# Patient Record
Sex: FEMALE | Race: WHITE | Employment: OTHER | URBAN - METROPOLITAN AREA
[De-identification: names, ages, dates, MRNs, and addresses within clinical notes are randomized per-mention and may not be internally consistent; named-entity substitution may affect disease eponyms.]

---

## 2017-01-24 ENCOUNTER — HOSPITAL ENCOUNTER (OUTPATIENT)
Dept: ULTRASOUND IMAGING | Age: 57
Discharge: HOME | End: 2017-01-24

## 2017-01-24 DIAGNOSIS — R10.2 PELVIC PAIN IN FEMALE: ICD-10-CM

## 2017-01-24 DIAGNOSIS — R10.2 PELVIC AND PERINEAL PAIN: ICD-10-CM

## 2017-10-04 ENCOUNTER — OFFICE VISIT (OUTPATIENT)
Dept: ORTHOPEDIC SURGERY | Age: 57
End: 2017-10-04

## 2017-10-04 VITALS — HEIGHT: 59 IN | BODY MASS INDEX: 37.9 KG/M2 | RESPIRATION RATE: 16 BRPM | WEIGHT: 188 LBS

## 2017-10-04 DIAGNOSIS — S52.122A CLOSED DISPLACED FRACTURE OF HEAD OF LEFT RADIUS, INITIAL ENCOUNTER: Primary | ICD-10-CM

## 2017-10-04 PROCEDURE — 99203 OFFICE O/P NEW LOW 30 MIN: CPT | Performed by: ORTHOPAEDIC SURGERY

## 2017-10-04 PROCEDURE — 24650 CLTX RDL HEAD/NCK FX WO MNPJ: CPT | Performed by: ORTHOPAEDIC SURGERY

## 2017-10-04 NOTE — MR AVS SNAPSHOT
Tetanus Combination Vaccine (1 - Tdap) 2/8/1981    Pap Smear 2/8/1983    Cholesterol Screening 2/8/2002    Diabetes Screening 2/8/2002    Colonoscopy 2/8/2012    Mammograms are recommended every 2 years for low/average risk patients aged 48 - 69, and every year for high risk patients per updated national guidelines. However these guidelines can be individualized by your provider. 9/20/2014    Yearly Flu Vaccine (1) 9/1/2017            MyChart Signup           [employer] allows you to send messages to your doctor, view your test results, renew your prescriptions, schedule appointments, view visit notes, and more. How Do I Sign Up? 1. In your Internet browser, go to [email or web address]  2. Click on the Sign Up Now link in the Sign In box. You will see the New Member Sign Up page. 3. Enter your [employer] Access Code exactly as it appears below. You will not need to use this code after youve completed the sign-up process. If you do not sign up before the expiration date, you must request a new code. [employer] Access Code: [email or web address]  Expires: 11/28/2017 10:34 PM    4. Enter your Social Security Number ([id number]) and Date of Birth (mm/dd/yyyy) as indicated and click Submit. You will be taken to the next sign-up page. 5. Create a [employer] ID. This will be your [employer] login ID and cannot be changed, so think of one that is secure and easy to remember. 6. Create a [employer] password. You can change your password at any time. 7. Enter your Password Reset Question and Answer. This can be used at a later time if you forget your password. 8. Enter your e-mail address. You will receive e-mail notification when new information is available in [address] 9. Click Sign Up. You can now view your medical record. Additional Information  If you have questions, please contact the physician practice where you receive care. Remember, [employer] is NOT to be used for urgent needs.  For medical emergencies, dial 911. For questions regarding your [employer] account call [phone]. If you have a clinical question, please call your doctor's office.

## 2017-10-04 NOTE — PROGRESS NOTES
CHIEF COMPLAINT: Left elbow pain/ minimally displaced radial head fracture. DATE OF INJURY: 9/29/2017 DOT: 10/4/2017    HISTORY:  Ms. Sharri Melvin is a 56 y.o.  female right handed who presents today for evaluation of a left elbow injury. The patient reports that this injury occurred when she tripped and fell over a boulder at her home. She was first seen and evaluated in [facility], when she was x-rayed and splinted, and asked to f/u with Orthopedics. The patient denies any other injuries. Rates pain a 10/10 VAS sharp achy and not improving. Movement makes the pain worse, the splint and resting makes the pain better. No numbness or tingling sensation. She does not work and quit smoking 10 days ago. Past Medical History:   Diagnosis Date    Anxiety     Depression        History reviewed. No pertinent surgical history. Social History     Social History    Marital status: Single     Spouse name: N/A    Number of children: N/A    Years of education: N/A     Occupational History    Not on file. Social History Main Topics    Smoking status: Former Smoker     Types: Cigarettes     Quit date: 9/26/2017    Smokeless tobacco: Not on file      Comment: trying to quit    Alcohol use No    Drug use: No    Sexual activity: Not on file     Other Topics Concern    Not on file     Social History Narrative       History reviewed. No pertinent family history.     Current Outpatient Prescriptions on File Prior to Visit   Medication Sig Dispense Refill    aspirin 81 MG chewable tablet       atorvastatin (LIPITOR) 10 MG tablet       celecoxib (CELEBREX) 200 MG capsule Take 200 mg by mouth      EPINEPHrine (EPIPEN 2-PHONG) 0.3 MG/0.3ML SOAJ injection INJECT SYRINGE AS DIRECTED FOR ALLERGIC REACTION      gabapentin (NEURONTIN) 300 MG capsule Take 300 mg by mouth      hydrochlorothiazide (HYDRODIURIL) 12.5 MG tablet       ibuprofen (ADVIL;MOTRIN) 100 MG tablet Take 100 mg by mouth      LORazepam (ATIVAN) 0.5 MG tablet TAKE 1 TABLET BY MOUTH TWO TIMES A DAY      omeprazole (PRILOSEC) 20 MG delayed release capsule Take 20 mg by mouth      tiotropium (SPIRIVA RESPIMAT) 2.5 MCG/ACT AERS inhaler       traMADol (ULTRAM) 50 MG tablet Take 50 mg by mouth      albuterol sulfate HFA (VENTOLIN HFA) 108 (90 Base) MCG/ACT inhaler       zolpidem (AMBIEN) 5 MG tablet TAKE 1 TABLET BY MOUTH AT BEDTIME      Fluticasone Furoate-Vilanterol (BREO ELLIPTA) 200-25 MCG/INH AEPB Inhale into the lungs      varenicline (CHANTIX) 1 MG tablet Take 1 mg by mouth 2 times daily      oxyCODONE-acetaminophen (PERCOCET) 5-325 MG per tablet Take 1 tablet by mouth every 6 hours as needed for Pain  Sedation precautions. Do not drive or operate machinery while taking this medication. Do not drink alcohol. This is an addictive medication and should be used sparingly. . 30 tablet 0     No current facility-administered medications on file prior to visit. Pertinent items are noted in HPI  Review of systems reviewed from Patient History Form dated on 10/4/2017 and available in the patient's chart under the Media tab. PHYSICAL EXAMINATION:  Ms. Veronica Whitehead is a very pleasant 56 y.o.  female who presents today in no acute distress, awake, alert, and oriented. She is well dressed, nourished and  groomed. Patient with normal affect. Height is  4' 11\" (1.499 m), weight is 188 lb (85.3 kg), Body mass index is 37.97 kg/(m^2). Resting respiratory rate is 16. On evaluation of her left upper extremity, there is no obvious deformity. There is moderate swelling and moderate ecchymosis. She is tender to palpation over the radial head, and otherwise nontender over the remainder of the extremity. Range of motion is decreased secondary to pain over the left elbow, but no mechanical block. The skin overlying the left elbow is intact without evidence of lesion, laceration or abrasion. Distal pulses are 2+ and symmetric bilaterally.

## 2017-10-06 PROBLEM — S52.122A CLOSED DISPLACED FRACTURE OF HEAD OF LEFT RADIUS: Status: ACTIVE | Noted: 2017-10-06

## 2017-10-12 RX ORDER — OXYCODONE HYDROCHLORIDE AND ACETAMINOPHEN 5; 325 MG/1; MG/1
1 TABLET ORAL EVERY 6 HOURS PRN
Qty: 28 TABLET | Refills: 0 | Status: ON HOLD | OUTPATIENT
Start: 2017-10-12 | End: 2022-07-07

## 2017-11-15 ENCOUNTER — OFFICE VISIT (OUTPATIENT)
Dept: ORTHOPEDIC SURGERY | Age: 57
End: 2017-11-15

## 2017-11-15 VITALS
HEIGHT: 59 IN | HEART RATE: 66 BPM | BODY MASS INDEX: 37.9 KG/M2 | WEIGHT: 188 LBS | SYSTOLIC BLOOD PRESSURE: 123 MMHG | DIASTOLIC BLOOD PRESSURE: 70 MMHG

## 2017-11-15 DIAGNOSIS — S52.122A CLOSED DISPLACED FRACTURE OF HEAD OF LEFT RADIUS, INITIAL ENCOUNTER: Primary | ICD-10-CM

## 2017-11-15 PROCEDURE — 99024 POSTOP FOLLOW-UP VISIT: CPT | Performed by: NURSE PRACTITIONER

## 2017-11-16 ENCOUNTER — HOSPITAL ENCOUNTER (OUTPATIENT)
Dept: OTHER | Age: 57
Discharge: HOME | End: 2017-11-30
Attending: ORTHOPAEDIC SURGERY | Admitting: ORTHOPAEDIC SURGERY

## 2017-11-16 ASSESSMENT — PAIN DESCRIPTION - DESCRIPTORS: DESCRIPTORS: ACHING;SORE;SHOOTING;SHARP;CONSTANT

## 2017-11-16 ASSESSMENT — PAIN DESCRIPTION - ONSET: ONSET: ON-GOING

## 2017-11-16 ASSESSMENT — PAIN SCALES - GENERAL: PAINLEVEL_OUTOF10: 10

## 2017-11-16 ASSESSMENT — PAIN DESCRIPTION - LOCATION: LOCATION: ELBOW

## 2017-11-16 ASSESSMENT — PAIN DESCRIPTION - PAIN TYPE: TYPE: ACUTE PAIN

## 2017-11-16 ASSESSMENT — PAIN DESCRIPTION - FREQUENCY: FREQUENCY: CONTINUOUS

## 2017-11-16 ASSESSMENT — PAIN DESCRIPTION - ORIENTATION: ORIENTATION: LEFT

## 2017-11-16 ASSESSMENT — PAIN DESCRIPTION - PROGRESSION: CLINICAL_PROGRESSION: NOT CHANGED

## 2017-11-16 NOTE — FLOWSHEET NOTE
Physical Therapy Daily Treatment Note  Date:  2017    Patient Name:  Virginia Lowe    :  1960  MRN: [id number]  Restrictions/Precautions:    Pertinent Medical History:  Medical/Treatment Diagnosis Information:  · Diagnosis: closed displaced fracture of the head of the left radius  · Treatment Diagnosis: decreased ability to do adl's  Insurance/Certification information:  [place] [facility] [place] [facility] [place]  Physician Information:  Referring Practitioner: Dr. Chaidez Channel of care signed (Y/N): routed   Visit# / total visits:  Pain level: 4-10/10     G-Code (if applicable):  ,  3630 CL    Date / Visit # G-Code Applied:  /  PT G-Codes  Functional Assessment Tool Used: quick dash  Score: 39  Functional Limitation: Carrying, moving and handling objects  Carrying, Moving and Handling Objects Current Status (): At least 60 percent but less than 80 percent impaired, limited or restricted  Carrying, Moving and Handling Objects Goal Status (): At least 40 percent but less than 60 percent impaired, limited or restricted    Progress Note: []  Yes  []  No  Next due by: Visit #10      History of Injury:Pt is a 57 y/o female who fell while bringing some items to her car and fractured her left radial head. They did not do surgery. She  was in a sling for a few weeks. She c/o constant aching pain at rest with sharp stabbing pain if she tries to use it. She can't straighten her elbow all the way out. She is not able  to all of her adl's and lift. She wakes due to pain. She also says she can only lift a pound or 2 with her left arm.   She hopes to return to normal function    Subjective:   Pt states, \" I can;t straighten my arm out \"    Objective:  See eval:      Exercises:  Exercise/Equipment Resistance/Repetitions Other comments   Nu step     prom     tband ext, flex     Wrist motions with weight     Roll up     bosu push                                        HEP      triceps curl Red x 20    Wall push X 15    Wrist flex, ext, rd, ud X 30    Sup and pron X 30           Other Therapeutic Activities:      Home Exercise Program:  Patient demonstrated proper technique, good tolerance,  and was given written instructions for the above exercises      Manual Treatments:  Prom, elbow wrist all motions, mfr to wrist and elbow flexors x 15 min, gentle iastm to wrist and elbow flex    Modalities:  kineseo to elbow and wrist flex and ext 2 strips ea,  Cp x 10    Timed Code Treatment Minutes:  30    Total Treatment Minutes:  70    Treatment/Activity Tolerance:  [x] Patient tolerated treatment well [] Patient limited by fatigue  [] Patient limited by pain  [] Patient limited by other medical complications  [] Other:     Prognosis: [x] Good [] Fair  [] Poor    Patient Requires Follow-up: [x] Yes  [] No    Plan:   [] Continue per plan of care [] Alter current plan (see comments)  [x] Plan of care initiated [] Hold pending MD visit [] Discharge  Plan for Next Session: mfr, jnt mobs, elbow, wrist, shoulder rom, strength, functional activities, proprio, hep, mods as needed     Electronically signed by:  Jonathan Officer, PT

## 2017-11-16 NOTE — PROGRESS NOTES
the severity of the following symptoms in the last week. None Mild Moderate Severe Extreme   9. Arm, shoulder or hand pain    [] 1  [] 2  [] 3  [x] 4  [] 5   10. Tingling (pins and needles) in your arm, shoulder or hand    [] 1  [] 2  [x] 3  [] 4  [] 5      No Difficulty Mild Difficulty Moderate Difficulty Severe Difficulty So Much Difficulty That I Can't Sleep    11. During the past week, how much difficulty have you had sleeping because of the pain in your arm, shoulder or hand? [] 1  [] 2  [x] 3  [] 4  [] 5     Sum of Scores: __39__    QuickDASH Disability/Symptom Score (as found below): ____    QuickDASH Disability/Symptom Score =     A QuickDASH score may not be calculated if there is a greater than 1 missing item.     G-Code Crosswalk:  Quick DASH Total Score Disability Index CMS Modifier   11 or less 0% []CH   12-19 1-19% []CI   20-28 20-39% []CJ   29-37 40-59% []CK   38-46 60-79% [x]CL   47-54 80-99% []CM   55 100% []CN
90% full wrist and elbow rom to help her reach her gaol  Short term goal 2: pt will have 5-/5 elbow and wrist strength to help with her gaol  Short term goal 3: pt will have 30#  strength on the left to help with her gaol  Patient Goals   Patient goals : pts goal is to return to normal function and lifting       Therapy Time   Individual Concurrent Group Co-treatment   Time In  800         Time Out  910         Minutes  [address], PT

## 2017-11-20 NOTE — PROGRESS NOTES
hydrochlorothiazide (HYDRODIURIL) 12.5 MG tablet       ibuprofen (ADVIL;MOTRIN) 100 MG tablet Take 100 mg by mouth      LORazepam (ATIVAN) 0.5 MG tablet TAKE 1 TABLET BY MOUTH TWO TIMES A DAY      omeprazole (PRILOSEC) 20 MG delayed release capsule Take 20 mg by mouth      tiotropium (SPIRIVA RESPIMAT) 2.5 MCG/ACT AERS inhaler       traMADol (ULTRAM) 50 MG tablet Take 50 mg by mouth      albuterol sulfate HFA (VENTOLIN HFA) 108 (90 Base) MCG/ACT inhaler       zolpidem (AMBIEN) 5 MG tablet TAKE 1 TABLET BY MOUTH AT BEDTIME      Fluticasone Furoate-Vilanterol (BREO ELLIPTA) 200-25 MCG/INH AEPB Inhale into the lungs      varenicline (CHANTIX) 1 MG tablet Take 1 mg by mouth 2 times daily       No current facility-administered medications on file prior to visit. Pertinent items are noted in HPI  Review of systems reviewed from Patient History Form dated on 10/4/2017 and available in the patient's chart under the Media tab. PHYSICAL EXAMINATION:  Ms. Lisette Medina is a very pleasant 56 y.o.  female who presents today in no acute distress, awake, alert, and oriented. She is well dressed, nourished and  groomed. Patient with normal affect. Height is  4' 10.8\" (1.494 m), weight is 188 lb (85.3 kg), Body mass index is 38.23 kg/m². Resting respiratory rate is 16. On evaluation of her left upper extremity, there is no obvious deformity. There is mild swelling and no ecchymosis. She has mild tenderness to palpation over the radial head, and otherwise nontender over the remainder of the extremity. Range of motion is decreased left elbow, but no mechanical block. The skin overlying the left elbow is intact without evidence of lesion, laceration or abrasion. Distal pulses are 2+ and symmetric bilaterally. Sensation is grossly intact to light touch and symmetric bilaterally.     IMAGING:  Xrays taken today in the office, 3 views of left elbow were reviewed, and showed minimally displaced radial head fracture. IMPRESSION:  Left minimally displaced radial head fracture. PLAN: She can discontinue the sling and was instructed to work on ROM left elbow, with no heavy impact activities. I discussed with the patient that I think that she would really benefit from a course of physical therapy for further strengthening and stretching. An Rx for physical therapy was given to the patient. We discussed the risk of nonunion and or malunion. We will see her  back in 2 months at which time we will get a new xray of the left elbow. As this patient has demonstrated risk factors for osteoporosis, such as age greater than [de-identified] years and evidence of a fracture, I have referred the patient back to the primary care physician for evaluation for osteoporosis, including consideration for DEXA scanning, if this is felt to be clinically indicated. The patient is advised to contact the primary care physician to follow-up for further evaluation.        Crystal Louis, CNP

## 2017-11-28 ENCOUNTER — HOSPITAL ENCOUNTER (OUTPATIENT)
Dept: PHYSICAL THERAPY | Age: 57
Discharge: HOME | End: 2017-11-29
Admitting: ORTHOPAEDIC SURGERY

## 2017-11-30 ENCOUNTER — HOSPITAL ENCOUNTER (OUTPATIENT)
Dept: PHYSICAL THERAPY | Age: 57
Discharge: HOME | End: 2017-12-01
Admitting: ORTHOPAEDIC SURGERY

## 2017-12-01 ENCOUNTER — HOSPITAL ENCOUNTER (OUTPATIENT)
Dept: OTHER | Age: 57
Discharge: HOME | End: 2017-12-31
Attending: ORTHOPAEDIC SURGERY | Admitting: ORTHOPAEDIC SURGERY

## 2017-12-05 ENCOUNTER — HOSPITAL ENCOUNTER (OUTPATIENT)
Dept: PHYSICAL THERAPY | Age: 57
Discharge: HOME | End: 2017-12-06
Admitting: ORTHOPAEDIC SURGERY

## 2017-12-07 ENCOUNTER — HOSPITAL ENCOUNTER (OUTPATIENT)
Dept: PHYSICAL THERAPY | Age: 57
Discharge: HOME | End: 2017-12-08
Admitting: ORTHOPAEDIC SURGERY

## 2017-12-12 ENCOUNTER — HOSPITAL ENCOUNTER (OUTPATIENT)
Dept: PHYSICAL THERAPY | Age: 57
Discharge: HOME | End: 2017-12-13
Admitting: ORTHOPAEDIC SURGERY

## 2017-12-14 ENCOUNTER — HOSPITAL ENCOUNTER (OUTPATIENT)
Dept: PHYSICAL THERAPY | Age: 57
Discharge: HOME | End: 2017-12-15
Admitting: ORTHOPAEDIC SURGERY

## 2017-12-19 ENCOUNTER — HOSPITAL ENCOUNTER (OUTPATIENT)
Dept: PHYSICAL THERAPY | Age: 57
Discharge: HOME | End: 2017-12-20
Admitting: ORTHOPAEDIC SURGERY

## 2017-12-26 ENCOUNTER — HOSPITAL ENCOUNTER (OUTPATIENT)
Dept: PHYSICAL THERAPY | Age: 57
Discharge: HOME | End: 2017-12-27
Admitting: ORTHOPAEDIC SURGERY

## 2018-01-01 ENCOUNTER — HOSPITAL ENCOUNTER (OUTPATIENT)
Dept: OTHER | Age: 58
Discharge: HOME | End: 2018-01-31
Attending: ORTHOPAEDIC SURGERY | Admitting: ORTHOPAEDIC SURGERY

## 2018-01-11 ENCOUNTER — HOSPITAL ENCOUNTER (OUTPATIENT)
Dept: PHYSICAL THERAPY | Age: 58
Discharge: HOME | End: 2018-01-12
Admitting: ORTHOPAEDIC SURGERY

## 2018-02-01 ENCOUNTER — HOSPITAL ENCOUNTER (OUTPATIENT)
Dept: OTHER | Age: 58
Discharge: HOME | End: 2018-02-28
Attending: ORTHOPAEDIC SURGERY | Admitting: ORTHOPAEDIC SURGERY

## 2018-06-29 ENCOUNTER — HOSPITAL ENCOUNTER (OUTPATIENT)
Dept: PULMONOLOGY | Age: 58
Discharge: HOME | End: 2018-06-29
Attending: FAMILY MEDICINE | Admitting: FAMILY MEDICINE

## 2018-06-29 DIAGNOSIS — J44.9 CHRONIC OBSTRUCTIVE PULMONARY DISEASE, UNSPECIFIED COPD TYPE (HCC): ICD-10-CM

## 2018-06-29 DIAGNOSIS — J44.9 CHRONIC OBSTRUCTIVE PULMONARY DISEASE (HCC): ICD-10-CM

## 2018-06-29 PROCEDURE — 94729 DIFFUSING CAPACITY: CPT | Performed by: INTERNAL MEDICINE

## 2018-06-29 PROCEDURE — 94727 GAS DIL/WSHOT DETER LNG VOL: CPT | Performed by: INTERNAL MEDICINE

## 2018-06-29 PROCEDURE — 94060 EVALUATION OF WHEEZING: CPT | Performed by: INTERNAL MEDICINE

## 2018-06-29 RX ORDER — ALBUTEROL SULFATE 90 UG/1
4 AEROSOL, METERED RESPIRATORY (INHALATION) ONCE
Status: COMPLETED | OUTPATIENT
Start: 2018-06-29 | End: 2018-06-29

## 2018-06-29 RX ADMIN — ALBUTEROL SULFATE 4 PUFF: 90 AEROSOL, METERED RESPIRATORY (INHALATION) at 12:38

## 2019-08-02 ENCOUNTER — APPOINTMENT (OUTPATIENT)
Dept: CT IMAGING | Age: 59
End: 2019-08-02
Payer: COMMERCIAL

## 2019-08-02 ENCOUNTER — HOSPITAL ENCOUNTER (EMERGENCY)
Age: 59
Discharge: ANOTHER HOSPITAL | End: 2019-08-03
Attending: EMERGENCY MEDICINE
Payer: COMMERCIAL

## 2019-08-02 DIAGNOSIS — T88.8XXA FLUID COLLECTION AT SURGICAL SITE, INITIAL ENCOUNTER: Primary | ICD-10-CM

## 2019-08-02 LAB
A/G RATIO: 0.6 (ref 1.1–2.2)
ALBUMIN SERPL-MCNC: 2.7 G/DL (ref 3.4–5)
ALP BLD-CCNC: 124 U/L (ref 40–129)
ALT SERPL-CCNC: 9 U/L (ref 10–40)
ANION GAP SERPL CALCULATED.3IONS-SCNC: 19 MMOL/L (ref 3–16)
AST SERPL-CCNC: 18 U/L (ref 15–37)
BASOPHILS ABSOLUTE: 0 K/UL (ref 0–0.2)
BASOPHILS RELATIVE PERCENT: 0.3 %
BILIRUB SERPL-MCNC: 0.5 MG/DL (ref 0–1)
BUN BLDV-MCNC: 22 MG/DL (ref 7–20)
CALCIUM SERPL-MCNC: 9.2 MG/DL (ref 8.3–10.6)
CHLORIDE BLD-SCNC: 93 MMOL/L (ref 99–110)
CO2: 26 MMOL/L (ref 21–32)
CREAT SERPL-MCNC: 0.6 MG/DL (ref 0.6–1.1)
EOSINOPHILS ABSOLUTE: 0 K/UL (ref 0–0.6)
EOSINOPHILS RELATIVE PERCENT: 0.2 %
GFR AFRICAN AMERICAN: >60
GFR NON-AFRICAN AMERICAN: >60
GLOBULIN: 4.4 G/DL
GLUCOSE BLD-MCNC: 96 MG/DL (ref 70–99)
HCT VFR BLD CALC: 39.4 % (ref 36–48)
HEMOGLOBIN: 13.1 G/DL (ref 12–16)
LIPASE: 13 U/L (ref 13–60)
LYMPHOCYTES ABSOLUTE: 1.1 K/UL (ref 1–5.1)
LYMPHOCYTES RELATIVE PERCENT: 15.6 %
MCH RBC QN AUTO: 29.9 PG (ref 26–34)
MCHC RBC AUTO-ENTMCNC: 33.4 G/DL (ref 31–36)
MCV RBC AUTO: 89.5 FL (ref 80–100)
MONOCYTES ABSOLUTE: 0.5 K/UL (ref 0–1.3)
MONOCYTES RELATIVE PERCENT: 7.7 %
NEUTROPHILS ABSOLUTE: 5.2 K/UL (ref 1.7–7.7)
NEUTROPHILS RELATIVE PERCENT: 76.2 %
PDW BLD-RTO: 14.8 % (ref 12.4–15.4)
PLATELET # BLD: 450 K/UL (ref 135–450)
PMV BLD AUTO: 8 FL (ref 5–10.5)
POTASSIUM REFLEX MAGNESIUM: 3.8 MMOL/L (ref 3.5–5.1)
RBC # BLD: 4.4 M/UL (ref 4–5.2)
SODIUM BLD-SCNC: 138 MMOL/L (ref 136–145)
TOTAL PROTEIN: 7.1 G/DL (ref 6.4–8.2)
TROPONIN: <0.01 NG/ML
WBC # BLD: 6.8 K/UL (ref 4–11)

## 2019-08-02 PROCEDURE — 84484 ASSAY OF TROPONIN QUANT: CPT

## 2019-08-02 PROCEDURE — 6360000004 HC RX CONTRAST MEDICATION: Performed by: EMERGENCY MEDICINE

## 2019-08-02 PROCEDURE — 80053 COMPREHEN METABOLIC PANEL: CPT

## 2019-08-02 PROCEDURE — 85025 COMPLETE CBC W/AUTO DIFF WBC: CPT

## 2019-08-02 PROCEDURE — 96361 HYDRATE IV INFUSION ADD-ON: CPT

## 2019-08-02 PROCEDURE — 83690 ASSAY OF LIPASE: CPT

## 2019-08-02 PROCEDURE — 2580000003 HC RX 258: Performed by: EMERGENCY MEDICINE

## 2019-08-02 PROCEDURE — 99285 EMERGENCY DEPT VISIT HI MDM: CPT

## 2019-08-02 PROCEDURE — 74177 CT ABD & PELVIS W/CONTRAST: CPT

## 2019-08-02 RX ORDER — 0.9 % SODIUM CHLORIDE 0.9 %
1000 INTRAVENOUS SOLUTION INTRAVENOUS ONCE
Status: COMPLETED | OUTPATIENT
Start: 2019-08-02 | End: 2019-08-03

## 2019-08-02 RX ADMIN — SODIUM CHLORIDE 1000 ML: 9 INJECTION, SOLUTION INTRAVENOUS at 23:20

## 2019-08-02 RX ADMIN — IOPAMIDOL 75 ML: 755 INJECTION, SOLUTION INTRAVENOUS at 23:50

## 2019-08-02 ASSESSMENT — PAIN SCALES - GENERAL
PAINLEVEL_OUTOF10: 0
PAINLEVEL_OUTOF10: 0

## 2019-08-03 VITALS
DIASTOLIC BLOOD PRESSURE: 76 MMHG | WEIGHT: 161.38 LBS | HEIGHT: 58 IN | RESPIRATION RATE: 16 BRPM | BODY MASS INDEX: 33.87 KG/M2 | OXYGEN SATURATION: 98 % | SYSTOLIC BLOOD PRESSURE: 121 MMHG | TEMPERATURE: 98 F | HEART RATE: 77 BPM

## 2019-08-03 LAB
BACTERIA: ABNORMAL /HPF
BILIRUBIN URINE: ABNORMAL
BLOOD, URINE: NEGATIVE
CLARITY: CLEAR
COLOR: ABNORMAL
EPITHELIAL CELLS, UA: ABNORMAL /HPF
GLUCOSE URINE: NEGATIVE MG/DL
KETONES, URINE: >=80 MG/DL
LACTIC ACID: 1.2 MMOL/L (ref 0.4–2)
LEUKOCYTE ESTERASE, URINE: NEGATIVE
MICROSCOPIC EXAMINATION: YES
NITRITE, URINE: NEGATIVE
PH UA: 6.5 (ref 5–8)
PROTEIN UA: 30 MG/DL
RBC UA: ABNORMAL /HPF (ref 0–2)
SPECIFIC GRAVITY UA: >1.03 (ref 1–1.03)
URINE REFLEX TO CULTURE: ABNORMAL
URINE TYPE: ABNORMAL
UROBILINOGEN, URINE: 1 E.U./DL
WBC UA: ABNORMAL /HPF (ref 0–5)

## 2019-08-03 PROCEDURE — 81001 URINALYSIS AUTO W/SCOPE: CPT

## 2019-08-03 PROCEDURE — 6360000002 HC RX W HCPCS: Performed by: EMERGENCY MEDICINE

## 2019-08-03 PROCEDURE — 83605 ASSAY OF LACTIC ACID: CPT

## 2019-08-03 PROCEDURE — 2580000003 HC RX 258: Performed by: EMERGENCY MEDICINE

## 2019-08-03 PROCEDURE — 96365 THER/PROPH/DIAG IV INF INIT: CPT

## 2019-08-03 PROCEDURE — 96361 HYDRATE IV INFUSION ADD-ON: CPT

## 2019-08-03 PROCEDURE — 87040 BLOOD CULTURE FOR BACTERIA: CPT

## 2019-08-03 RX ORDER — 0.9 % SODIUM CHLORIDE 0.9 %
1000 INTRAVENOUS SOLUTION INTRAVENOUS ONCE
Status: COMPLETED | OUTPATIENT
Start: 2019-08-03 | End: 2019-08-03

## 2019-08-03 RX ADMIN — PIPERACILLIN SODIUM AND TAZOBACTAM SODIUM 3.38 G: 3; .375 INJECTION, POWDER, FOR SOLUTION INTRAVENOUS at 02:23

## 2019-08-03 RX ADMIN — SODIUM CHLORIDE 1000 ML: 9 INJECTION, SOLUTION INTRAVENOUS at 02:40

## 2019-08-03 ASSESSMENT — ENCOUNTER SYMPTOMS
EYE ITCHING: 0
EYE PAIN: 0
APNEA: 0
SHORTNESS OF BREATH: 0
EYE REDNESS: 0
PHOTOPHOBIA: 0
CHOKING: 0
CHEST TIGHTNESS: 0
NAUSEA: 1
WHEEZING: 0
EYE DISCHARGE: 0
ABDOMINAL PAIN: 1
ABDOMINAL DISTENTION: 0
VOMITING: 1
STRIDOR: 0
COUGH: 0

## 2019-08-03 ASSESSMENT — PAIN SCALES - GENERAL
PAINLEVEL_OUTOF10: 0
PAINLEVEL_OUTOF10: 0

## 2019-08-03 NOTE — ED PROVIDER NOTES
urgent intervention. PROCEDURES:  Unlessotherwise noted below, none    FINAL IMPRESSION      1.  Fluid collection at surgical site, initial encounter          DISPOSITION/PLAN   DISPOSITION      Transfer     (Please note that portions ofthis note were completed with a voice recognition program.  Efforts were made to edit the dictations but occasionally words are mis-transcribed.)    Suzanne Coats MD(electronically signed)  Attending Emergency Physician        Suzanne Coats MD  08/03/19 0270

## 2019-08-08 LAB
BLOOD CULTURE, ROUTINE: NORMAL
CULTURE, BLOOD 2: NORMAL

## 2020-06-05 ENCOUNTER — HOSPITAL ENCOUNTER (OUTPATIENT)
Dept: MRI IMAGING | Age: 60
Discharge: HOME | End: 2020-06-05
Payer: MEDICARE

## 2020-06-05 PROCEDURE — 72148 MRI LUMBAR SPINE W/O DYE: CPT

## 2021-07-26 ENCOUNTER — HOSPITAL ENCOUNTER (EMERGENCY)
Age: 61
Discharge: HOME | End: 2021-07-26
Payer: MEDICARE

## 2021-07-26 ENCOUNTER — APPOINTMENT (OUTPATIENT)
Dept: GENERAL RADIOLOGY | Age: 61
End: 2021-07-26
Payer: MEDICARE

## 2021-07-26 VITALS
DIASTOLIC BLOOD PRESSURE: 89 MMHG | RESPIRATION RATE: 18 BRPM | SYSTOLIC BLOOD PRESSURE: 154 MMHG | HEART RATE: 78 BPM | OXYGEN SATURATION: 96 % | TEMPERATURE: 98.1 F

## 2021-07-26 DIAGNOSIS — S63.91XA HAND SPRAIN, RIGHT, INITIAL ENCOUNTER: Primary | ICD-10-CM

## 2021-07-26 PROCEDURE — 99282 EMERGENCY DEPT VISIT SF MDM: CPT

## 2021-07-26 PROCEDURE — 73130 X-RAY EXAM OF HAND: CPT

## 2021-07-26 ASSESSMENT — PAIN SCALES - WONG BAKER: WONGBAKER_NUMERICALRESPONSE: 8

## 2021-07-26 ASSESSMENT — PAIN DESCRIPTION - ORIENTATION: ORIENTATION: RIGHT

## 2021-07-26 ASSESSMENT — PAIN DESCRIPTION - DESCRIPTORS: DESCRIPTORS: THROBBING

## 2021-07-26 ASSESSMENT — PAIN SCALES - GENERAL: PAINLEVEL_OUTOF10: 9

## 2021-07-26 ASSESSMENT — PAIN DESCRIPTION - FREQUENCY: FREQUENCY: CONTINUOUS

## 2021-07-26 ASSESSMENT — PAIN DESCRIPTION - PAIN TYPE: TYPE: ACUTE PAIN

## 2021-07-26 ASSESSMENT — PAIN DESCRIPTION - LOCATION: LOCATION: HAND

## 2021-07-26 NOTE — ED PROVIDER NOTES
[address]      Pt Name: Chris Holt  MRN: [id number]  [place] 1960  Date of evaluation: 7/26/2021  Provider: DEXTER Rivera    The ED Attending Physician was available for consultation but did not see or evaluate this patient. CHIEF COMPLAINT       Chief Complaint   Patient presents with    Hand Injury     fall last night, etoh       HISTORY OF PRESENT ILLNESS  (Location/Symptom, Timing/Onset, Context/Setting, Quality, Duration, Modifying Factors, Severity.)   Nina Jesus is a 63 y.o. female who presents to the emergency department with complaint of right hand pain and swelling. She says she injured it last night from falling, but she was intoxicated at the time does not remember exactly what happened or how the hand got injured. She assumed she must of extended the hand out in front of her when she fell to the ground. She says she also bumped her head and her right knee, but those do not hurt at all now, only the hand hurts. She says there is some swelling over the index and middle fingers around the knuckles, no cuts or bleeding. She says it is hard to make a fist or fully straighten out the fingers. Denies numbness. Denies any prior history of fracture or surgery to the affected area. No other complaints. Nursing Notes were reviewed and I agree. REVIEW OF SYSTEMS    (2-9 systems for level 4, 10 or more for level 5)     Constitutional:  Negative for fever, chills. Respiratory:  Negative for cough, shortness of breath. Cardiovascular:  Negative for chest pain, palpitations. Gastrointestinal:  Negative for vomiting, abdominal pain. Musculoskeletal: Positive for right hand pain and swelling. Negative for myalgias, neck pain or stiffness. Neurological:  Negative for dizziness, focal weakness, numbness. All other positives and pertinent negatives as per HPI.       PAST MEDICAL HISTORY         Diagnosis Date    Anxiety  Depression        SURGICAL HISTORY     No past surgical history on file. CURRENT MEDICATIONS       Previous Medications    ALBUTEROL SULFATE HFA (VENTOLIN HFA) 108 (90 BASE) MCG/ACT INHALER        ASPIRIN 81 MG CHEWABLE TABLET        ATORVASTATIN (LIPITOR) 10 MG TABLET        CELECOXIB (CELEBREX) 200 MG CAPSULE    Take 200 mg by mouth    EPINEPHRINE (EPIPEN 2-PHONG) 0.3 MG/0.3ML SOAJ INJECTION    INJECT SYRINGE AS DIRECTED FOR ALLERGIC REACTION    FLUTICASONE FUROATE-VILANTEROL (BREO ELLIPTA) 200-25 MCG/INH AEPB    Inhale into the lungs    GABAPENTIN (NEURONTIN) 300 MG CAPSULE    Take 300 mg by mouth    HYDROCHLOROTHIAZIDE (HYDRODIURIL) 12.5 MG TABLET        IBUPROFEN (ADVIL;MOTRIN) 100 MG TABLET    Take 100 mg by mouth    LORAZEPAM (ATIVAN) 0.5 MG TABLET    TAKE 1 TABLET BY MOUTH TWO TIMES A DAY    OMEPRAZOLE (PRILOSEC) 20 MG DELAYED RELEASE CAPSULE    Take 20 mg by mouth    OXYCODONE-ACETAMINOPHEN (PERCOCET) 5-325 MG PER TABLET    Take 1 tablet by mouth every 6 hours as needed for Pain . TIOTROPIUM (SPIRIVA RESPIMAT) 2.5 MCG/ACT AERS INHALER        TRAMADOL (ULTRAM) 50 MG TABLET    Take 50 mg by mouth    VARENICLINE (CHANTIX) 1 MG TABLET    Take 1 mg by mouth 2 times daily    ZOLPIDEM (AMBIEN) 5 MG TABLET    TAKE 1 TABLET BY MOUTH AT BEDTIME       ALLERGIES     Bee pollen and Codeine    FAMILY HISTORY     No family history on file. No family status information on file. SOCIAL HISTORY      reports that she quit smoking about 3 years ago. Her smoking use included cigarettes. She has never used smokeless tobacco. She reports that she does not drink alcohol and does not use drugs. PHYSICAL EXAM    (up to 7 for level 4, 8 or more for level 5)     ED Triage Vitals [07/26/21 1444]   BP Temp Temp Source Pulse Resp SpO2 Height Weight   (!) 154/89 98.1 °F (36.7 °C) Oral 78 18 96 % -- --       Constitutional:  Appearing well-developed and well-nourished. No distress.    Cardiovascular:  Normal rate, regular rhythm, normal heart sounds and intact distal pulses. Pulmonary/Chest:  Effort normal and breath sounds normal. No respiratory distress. Musculoskeletal: There is mild swelling noted surrounding the MCP joints and proximal phalanx areas of the right index and middle fingers, tender to palpation, and range of motion is limited to the PIP joints and MCP joints of these fingers, but there is strength with flexion extension against resistance throughout the joints of the hand. No lacerations or significant ecchymosis. Sensation to light touch grossly intact and capillary refill <3 seconds in all the digits of the right hand. Neurological:  Oriented to person, place, and time. No cranial nerve deficit observed. DIAGNOSTIC RESULTS     When ordered, EKGs are interpreted by the ED physician in the absence of a cardiologist. Please the physician's note for interpretation of EKG. RADIOLOGY:     Interpretation per the Radiologist below, if available at the time of this note:    XR HAND RIGHT (MIN 3 VIEWS)   Final Result   No acute bone or joint abnormality. LABS:  Labs Reviewed - No data to display    All other labs were within normal range or not returned as of this dictation. EMERGENCY DEPARTMENT COURSE and DIFFERENTIAL DIAGNOSIS/MDM:   Vitals:    Vitals:    07/26/21 1444   BP: (!) 154/89   Pulse: 78   Resp: 18   Temp: 98.1 °F (36.7 °C)   TempSrc: Oral   SpO2: 96%       The patient's condition in the ED was good, the patient was afebrile and nontoxic in appearance, and the patient's physical exam was unremarkable other than for the right hand findings noted above. X-ray showed no acute findings. Exam and history suggest a hand sprain, and there was no indication for hospitalization or further workup. Patient was given an Ace wrap for compression and comfort, and she will be advised to use anti-inflammatory medication as needed, and to rest the hand.   She will be given a referral for orthopedic follow-up care to be used only if she sees no improvement in symptoms after about a week. The patient verbalized understanding and agreement with this plan of care. The patient was advised to return to the emergency department if symptoms should significantly worsen or if new and concerning symptoms should appear. I estimate there is LOW risk for FRACTURE, COMPARTMENT SYNDROME, DEEP VENOUS THROMBOSIS, SEPTIC ARTHRITIS, NEUROVASCULAR COMPROMISE, or TENDON/LIGAMENT RUPTURE, thus I consider the discharge disposition reasonable. PROCEDURES:  None    FINAL IMPRESSION      1.  Hand sprain, right, initial encounter          DISPOSITION/PLAN   DISPOSITION Decision To Discharge 07/26/2021 06:00:47 PM      PATIENT REFERRED TO:  [facility]  [address] [place] [id number] [phone]  Call in 1 week  If no improvement in symptoms, for orthopedic follow-up care      DISCHARGE MEDICATIONS:  New Prescriptions    No medications on file       (Please note that portions of this note were completed with a voice recognition program.  Efforts were made to edit the dictations but occasionally words are mis-transcribed.)    Regine Floyd, [address] [place], [place]  07/26/21 1728

## 2021-07-26 NOTE — ED NOTES
Ace wrap applied to patient right wrist.  Pt discharged at this time. Discharge instructions and medications reviewed,  Questions were answered. PT verbalized understanding. VSS, Afebrile. Follow up appointments were discussed.          Martha Bolivar RN  07/26/21 4134

## 2022-07-04 ENCOUNTER — APPOINTMENT (OUTPATIENT)
Dept: CT IMAGING | Age: 62
DRG: 393 | End: 2022-07-04
Payer: MEDICARE

## 2022-07-04 ENCOUNTER — APPOINTMENT (OUTPATIENT)
Dept: GENERAL RADIOLOGY | Age: 62
DRG: 393 | End: 2022-07-04
Payer: MEDICARE

## 2022-07-04 ENCOUNTER — HOSPITAL ENCOUNTER (INPATIENT)
Age: 62
LOS: 2 days | Discharge: HOME | DRG: 393 | End: 2022-07-07
Attending: STUDENT IN AN ORGANIZED HEALTH CARE EDUCATION/TRAINING PROGRAM | Admitting: SURGERY
Payer: MEDICARE

## 2022-07-04 DIAGNOSIS — K91.89 GASTROINTESTINAL ANASTOMOTIC LEAK: Primary | ICD-10-CM

## 2022-07-04 DIAGNOSIS — Z90.3 S/P GASTRIC SLEEVE PROCEDURE: ICD-10-CM

## 2022-07-04 LAB
ALBUMIN SERPL-MCNC: 3 G/DL (ref 3.4–5)
ALP BLD-CCNC: 135 U/L (ref 40–129)
ALT SERPL-CCNC: 7 U/L (ref 10–40)
ANION GAP SERPL CALCULATED.3IONS-SCNC: 11 MMOL/L (ref 3–16)
AST SERPL-CCNC: 9 U/L (ref 15–37)
BACTERIA: ABNORMAL /HPF
BASOPHILS ABSOLUTE: 0.1 K/UL (ref 0–0.2)
BASOPHILS RELATIVE PERCENT: 0.5 %
BILIRUB SERPL-MCNC: 0.3 MG/DL (ref 0–1)
BILIRUBIN DIRECT: <0.2 MG/DL (ref 0–0.3)
BILIRUBIN URINE: ABNORMAL
BILIRUBIN, INDIRECT: ABNORMAL MG/DL (ref 0–1)
BLOOD, URINE: NEGATIVE
BUN BLDV-MCNC: 17 MG/DL (ref 7–20)
CALCIUM SERPL-MCNC: 9.1 MG/DL (ref 8.3–10.6)
CHLORIDE BLD-SCNC: 103 MMOL/L (ref 99–110)
CLARITY: ABNORMAL
CO2: 25 MMOL/L (ref 21–32)
COLOR: ABNORMAL
CREAT SERPL-MCNC: 0.8 MG/DL (ref 0.6–1.2)
EOSINOPHILS ABSOLUTE: 0.1 K/UL (ref 0–0.6)
EOSINOPHILS RELATIVE PERCENT: 0.4 %
EPITHELIAL CELLS, UA: 6 /HPF (ref 0–5)
GFR AFRICAN AMERICAN: >60
GFR NON-AFRICAN AMERICAN: >60
GLUCOSE BLD-MCNC: 104 MG/DL (ref 70–99)
GLUCOSE URINE: NEGATIVE MG/DL
HCT VFR BLD CALC: 34.6 % (ref 36–48)
HEMOGLOBIN: 11.6 G/DL (ref 12–16)
HYALINE CASTS: 20 /LPF (ref 0–8)
KETONES, URINE: 15 MG/DL
LACTIC ACID: 1 MMOL/L (ref 0.4–2)
LEUKOCYTE ESTERASE, URINE: ABNORMAL
LIPASE: 14 U/L (ref 13–60)
LYMPHOCYTES ABSOLUTE: 1.8 K/UL (ref 1–5.1)
LYMPHOCYTES RELATIVE PERCENT: 11.9 %
MCH RBC QN AUTO: 29.6 PG (ref 26–34)
MCHC RBC AUTO-ENTMCNC: 33.5 G/DL (ref 31–36)
MCV RBC AUTO: 88.5 FL (ref 80–100)
MICROSCOPIC EXAMINATION: YES
MONOCYTES ABSOLUTE: 0.9 K/UL (ref 0–1.3)
MONOCYTES RELATIVE PERCENT: 5.9 %
NEUTROPHILS ABSOLUTE: 12.5 K/UL (ref 1.7–7.7)
NEUTROPHILS RELATIVE PERCENT: 81.3 %
NITRITE, URINE: NEGATIVE
PDW BLD-RTO: 14.4 % (ref 12.4–15.4)
PH UA: 6 (ref 5–8)
PLATELET # BLD: 629 K/UL (ref 135–450)
PMV BLD AUTO: 7.3 FL (ref 5–10.5)
POTASSIUM REFLEX MAGNESIUM: 3.7 MMOL/L (ref 3.5–5.1)
PROTEIN UA: 100 MG/DL
RBC # BLD: 3.91 M/UL (ref 4–5.2)
RBC UA: 7 /HPF (ref 0–4)
SODIUM BLD-SCNC: 139 MMOL/L (ref 136–145)
SPECIFIC GRAVITY UA: 1.05 (ref 1–1.03)
TOTAL PROTEIN: 6.7 G/DL (ref 6.4–8.2)
URINE REFLEX TO CULTURE: ABNORMAL
URINE TYPE: ABNORMAL
UROBILINOGEN, URINE: 1 E.U./DL
WBC # BLD: 15.4 K/UL (ref 4–11)
WBC UA: 1 /HPF (ref 0–5)

## 2022-07-04 PROCEDURE — 81001 URINALYSIS AUTO W/SCOPE: CPT

## 2022-07-04 PROCEDURE — 83690 ASSAY OF LIPASE: CPT

## 2022-07-04 PROCEDURE — 80076 HEPATIC FUNCTION PANEL: CPT

## 2022-07-04 PROCEDURE — 99285 EMERGENCY DEPT VISIT HI MDM: CPT

## 2022-07-04 PROCEDURE — 6360000004 HC RX CONTRAST MEDICATION: Performed by: PHYSICIAN ASSISTANT

## 2022-07-04 PROCEDURE — 36415 COLL VENOUS BLD VENIPUNCTURE: CPT

## 2022-07-04 PROCEDURE — 96365 THER/PROPH/DIAG IV INF INIT: CPT

## 2022-07-04 PROCEDURE — 74177 CT ABD & PELVIS W/CONTRAST: CPT

## 2022-07-04 PROCEDURE — 71045 X-RAY EXAM CHEST 1 VIEW: CPT

## 2022-07-04 PROCEDURE — 85025 COMPLETE CBC W/AUTO DIFF WBC: CPT

## 2022-07-04 PROCEDURE — 96375 TX/PRO/DX INJ NEW DRUG ADDON: CPT

## 2022-07-04 PROCEDURE — 80048 BASIC METABOLIC PNL TOTAL CA: CPT

## 2022-07-04 PROCEDURE — 83605 ASSAY OF LACTIC ACID: CPT

## 2022-07-04 PROCEDURE — 6360000002 HC RX W HCPCS: Performed by: PHYSICIAN ASSISTANT

## 2022-07-04 PROCEDURE — 2580000003 HC RX 258: Performed by: PHYSICIAN ASSISTANT

## 2022-07-04 PROCEDURE — 96361 HYDRATE IV INFUSION ADD-ON: CPT

## 2022-07-04 RX ORDER — 0.9 % SODIUM CHLORIDE 0.9 %
1000 INTRAVENOUS SOLUTION INTRAVENOUS ONCE
Status: COMPLETED | OUTPATIENT
Start: 2022-07-04 | End: 2022-07-04

## 2022-07-04 RX ORDER — SODIUM CHLORIDE 9 MG/ML
INJECTION, SOLUTION INTRAVENOUS CONTINUOUS
Status: DISCONTINUED | OUTPATIENT
Start: 2022-07-04 | End: 2022-07-05

## 2022-07-04 RX ORDER — ONDANSETRON 2 MG/ML
4 INJECTION INTRAMUSCULAR; INTRAVENOUS ONCE
Status: COMPLETED | OUTPATIENT
Start: 2022-07-04 | End: 2022-07-04

## 2022-07-04 RX ADMIN — SODIUM CHLORIDE 1000 ML: 9 INJECTION, SOLUTION INTRAVENOUS at 19:34

## 2022-07-04 RX ADMIN — ONDANSETRON 4 MG: 2 INJECTION INTRAMUSCULAR; INTRAVENOUS at 19:34

## 2022-07-04 RX ADMIN — IOPAMIDOL 75 ML: 755 INJECTION, SOLUTION INTRAVENOUS at 20:46

## 2022-07-04 RX ADMIN — PIPERACILLIN AND TAZOBACTAM 3375 MG: 3; .375 INJECTION, POWDER, LYOPHILIZED, FOR SOLUTION INTRAVENOUS at 23:44

## 2022-07-04 ASSESSMENT — PAIN - FUNCTIONAL ASSESSMENT: PAIN_FUNCTIONAL_ASSESSMENT: NONE - DENIES PAIN

## 2022-07-04 NOTE — ED TRIAGE NOTES
Vomiting for past 3 weeks and is concerned stomach my have \"slipt open\"   Gastric sleeve surgery 3 years prior. Denies chest pain, SOB, dizziness. No issues with bowel movements or urinating. Last intake was yesterday around 2pm, kept one white castle down. Denies any new abdominal pain.

## 2022-07-04 NOTE — ED PROVIDER NOTES
[address] [facility]        Pt Name: Perfecto Owen  MRN: [id number]  Piero 1960  Date of evaluation: 7/4/2022  Provider: DEXTER Rodriguez  PCP: Mindi Burrows DO  Note Started: 6:47 PM EDT     This patient was also seen and evaluated by the attending physician Gregorio Echeverria MD.    [address] [place] [address]       Chief Complaint   Patient presents with    Emesis     throwing up \"pretty much\" for the last 3 weeks. had gastric sleeve in 2019 or 2020; per pt it split open 3 weeks post op-was seen here. pt concerned it happened again. HISTORY OF PRESENT ILLNESS   (Location, Timing/Onset, Context/Setting, Quality, Duration, Modifying Factors, Severity, Associated Signs and Symptoms)  Note limiting factors. Chief Complaint: Persistent vomiting    Nina Hernandez is a 63 y.o. female who presents reporting that she has had persistent vomiting for the last 3 weeks or so. She has history of a gastric bypass operation in 2019 or 2020, and she says that that was quickly followed by a rupture of her stomach and she had to go back to the operating room. She says that since then she does not get nausea or vomiting often, until about 3 weeks ago. She says over the past few weeks she has been keeping some food down, but often she vomits, even when she is not eating. Happens up to 10 times per day. She says she has some soreness in the epigastric area but no significant pain in the belly. She has been having some diarrhea, nonbloody. No urinary complaints. Denies any fever, chest pain, cough, shortness of breath. Denies any recent trauma. Nursing Notes were all reviewed and agreed with or any disagreements were addressed in the HPI. REVIEW OF SYSTEMS    (2-9 systems for level 4, 10 or more for level 5)     Review of Systems    Positives and pertinent negatives as per HPI.      PAST MEDICAL HISTORY     Past Medical History:   Diagnosis Date    Anxiety     Depression        SURGICAL HISTORY     Past Surgical History:   Procedure Laterality Date    STOMACH SURGERY      gastric sleeve       CURRENTMEDICATIONS       Previous Medications    ALBUTEROL SULFATE HFA (VENTOLIN HFA) 108 (90 BASE) MCG/ACT INHALER        ASPIRIN 81 MG CHEWABLE TABLET        ATORVASTATIN (LIPITOR) 10 MG TABLET        CELECOXIB (CELEBREX) 200 MG CAPSULE    Take 200 mg by mouth    EPINEPHRINE (EPIPEN 2-PHONG) 0.3 MG/0.3ML SOAJ INJECTION    INJECT SYRINGE AS DIRECTED FOR ALLERGIC REACTION    FLUTICASONE FUROATE-VILANTEROL (BREO ELLIPTA) 200-25 MCG/INH AEPB    Inhale into the lungs    GABAPENTIN (NEURONTIN) 300 MG CAPSULE    Take 300 mg by mouth    HYDROCHLOROTHIAZIDE (HYDRODIURIL) 12.5 MG TABLET        IBUPROFEN (ADVIL;MOTRIN) 100 MG TABLET    Take 100 mg by mouth    LORAZEPAM (ATIVAN) 0.5 MG TABLET    TAKE 1 TABLET BY MOUTH TWO TIMES A DAY    OMEPRAZOLE (PRILOSEC) 20 MG DELAYED RELEASE CAPSULE    Take 20 mg by mouth    OXYCODONE-ACETAMINOPHEN (PERCOCET) 5-325 MG PER TABLET    Take 1 tablet by mouth every 6 hours as needed for Pain . TIOTROPIUM (SPIRIVA RESPIMAT) 2.5 MCG/ACT AERS INHALER        TRAMADOL (ULTRAM) 50 MG TABLET    Take 50 mg by mouth    VARENICLINE (CHANTIX) 1 MG TABLET    Take 1 mg by mouth 2 times daily    ZOLPIDEM (AMBIEN) 5 MG TABLET    TAKE 1 TABLET BY MOUTH AT BEDTIME       ALLERGIES     Bee pollen and Codeine    FAMILYHISTORY     No family history on file.      SOCIAL HISTORY       Social History     Tobacco Use    Smoking status: Former Smoker     Types: Cigarettes     Quit date: 2017     Years since quittin.7    Smokeless tobacco: Never Used    Tobacco comment: trying to quit   Vaping Use    Vaping Use: Never used   Substance Use Topics    Alcohol use: No    Drug use: No       SCREENINGS    Carson Coma Scale  Eye Opening: Spontaneous  Best Verbal Response: Oriented  Best Motor Response: Obeys commands  Carson Coma Scale Score: 15 PHYSICAL EXAM    (up to 7 for level 4, 8 or more for level 5)     ED Triage Vitals [07/04/22 1827]   BP Temp Temp Source Heart Rate Resp SpO2 Height Weight   116/68 99.6 °F (37.6 °C) Oral 78 16 -- 4' 11\" (1.499 m) 154 lb 5.2 oz (70 kg)       Physical Exam  Vitals and nursing note reviewed. Constitutional:       General: She is not in acute distress. Appearance: Normal appearance. She is not ill-appearing. HENT:      Head: Normocephalic and atraumatic. Nose: Nose normal.   Eyes:      General:         Right eye: No discharge. Left eye: No discharge. Cardiovascular:      Rate and Rhythm: Normal rate and regular rhythm. Heart sounds: Normal heart sounds. No murmur heard. No gallop. Pulmonary:      Effort: Pulmonary effort is normal. No respiratory distress. Breath sounds: Normal breath sounds. No stridor. No wheezing, rhonchi or rales. Abdominal:      General: Bowel sounds are normal. There is no distension. Palpations: Abdomen is soft. There is no mass. Tenderness: There is abdominal tenderness (Mild, epigastric). There is no guarding or rebound. Musculoskeletal:         General: Normal range of motion. Cervical back: Normal range of motion. Skin:     General: Skin is warm and dry. Neurological:      General: No focal deficit present. Mental Status: She is alert and oriented to person, place, and time.    Psychiatric:         Mood and Affect: Mood normal.         Behavior: Behavior normal.         DIAGNOSTIC RESULTS   LABS:    Labs Reviewed   URINALYSIS WITH REFLEX TO CULTURE - Abnormal; Notable for the following components:       Result Value    Color, UA DARK YELLOW (*)     Clarity, UA CLOUDY (*)     Bilirubin Urine MODERATE (*)     Ketones, Urine 15 (*)     Protein,  (*)     Leukocyte Esterase, Urine TRACE (*)     All other components within normal limits   CBC WITH AUTO DIFFERENTIAL - Abnormal; Notable for the following components:    WBC 15.4 (*)     RBC 3.91 (*)     Hemoglobin 11.6 (*)     Hematocrit 34.6 (*)     Platelets 724 (*)     Neutrophils Absolute 12.5 (*)     All other components within normal limits   BASIC METABOLIC PANEL W/ REFLEX TO MG FOR LOW K - Abnormal; Notable for the following components:    Glucose 104 (*)     All other components within normal limits   HEPATIC FUNCTION PANEL - Abnormal; Notable for the following components:    Albumin 3.0 (*)     Alkaline Phosphatase 135 (*)     ALT 7 (*)     AST 9 (*)     All other components within normal limits   MICROSCOPIC URINALYSIS - Abnormal; Notable for the following components:    Bacteria, UA Rare (*)     Hyaline Casts, UA 20 (*)     RBC, UA 7 (*)     Epithelial Cells, UA 6 (*)     All other components within normal limits   LIPASE   LACTIC ACID       When ordered only abnormal lab results are displayed. All other labs were within normal range or not returned as of this dictation. EKG: When ordered, EKG's are interpreted by the Emergency Department Physician in the absence of a cardiologist.  Please see their note for interpretation of EKG. RADIOLOGY:   Non-plain film images such as CT, Ultrasound and MRI are read by the radiologist. Plain radiographic images are visualized and preliminarily interpreted by the ED Provider with the below findings:    Interpretation per the Radiologist below, if available at the time of this note:    CT ABDOMEN PELVIS W IV CONTRAST Additional Contrast? Oral   Final Result   Postsurgical changes again noted of gastric bypass. Extraluminal gas locules   seen extending adjacent to the GE junction and gastrohepatic region for   example axial series 2, image 28 through 40 in similar location to previous   2019 examination and could represent components of residual or recurrent   involvement. Along the lateral surgical margin there is additional confluent   fluid signal without gas locules measuring up to 4.8 cm series 2, image 30   additionally noted. Considerations for chronic or recurrent leak associated   with postsurgical margins at the GE junction and proximal gastric region. No   focal peripherally enhancing abscess although irregular signal as detailed   may be further evaluated with enteric contrast.      RECOMMENDATIONS:   Unavailable         XR CHEST PORTABLE   Final Result   1. Small left pleural effusion, left basilar atelectasis   2. Trace right pleural effusion             CONSULTS:  IP CONSULT TO GENERAL SURGERY    PROCEDURES   Unless otherwise noted below, none. Procedures    EMERGENCY DEPARTMENT COURSE and DIFFERENTIAL DIAGNOSIS/MDM:   Vitals:    Vitals:    07/04/22 1827 07/04/22 1858 07/04/22 1913 07/04/22 1928   BP: 116/68 114/77 119/77 111/67   Pulse: 78   74   Resp: 16      Temp: 99.6 °F (37.6 °C)      TempSrc: Oral      SpO2:  100% 100% 96%   Weight: 154 lb 5.2 oz (70 kg)      Height: 4' 11\" (1.499 m)          Patient was given the following medications:  Medications   iohexol (OMNIPAQUE 240) injection 50 mL (has no administration in time range)   0.9 % sodium chloride infusion (has no administration in time range)   ondansetron (ZOFRAN) injection 4 mg (4 mg IntraVENous Given 7/4/22 1934)   0.9 % sodium chloride bolus (0 mLs IntraVENous Stopped 7/4/22 2101)   iopamidol (ISOVUE-370) 76 % injection 75 mL (75 mLs IntraVENous Given 7/4/22 2046)   piperacillin-tazobactam (ZOSYN) 3,375 mg in dextrose 5 % 50 mL IVPB (mini-bag) (3,375 mg IntraVENous New Bag 7/4/22 2344)           Is this patient to be included in the SEP-1 Core Measure due to severe sepsis or septic shock? No   Exclusion criteria - the patient is NOT to be included for SEP-1 Core Measure due to:  May have criteria for sepsis, but does not meet criteria for severe sepsis or septic shock    Patient had only minimal abdominal tenderness on presentation. Normal vital signs. Lab work-up showed a leukocytosis of 15.4, with elevated neutrophils at 12.5.   Platelet count elevated 629.  Normal serum lactate, normal lipase. .  Patient was unable to tolerate oral contrast.  CT scan and pelvis with IV contrast revealed findings of extraluminal gas suspicious for gastric leak. I consulted our general surgeon on-call, Dr. Maryellen Cross, who advised transfer to the patient's bariatric surgery service at [facility]. Patient was given IV fluids and antibiotics in the ED. A call was placed to the  general/bariatric surgery service, and I spoke by phone with the surgeon on-call, Dr. Houston Nguyen, who agreed to accept the patient for transfer. Awaiting a bed assignment at [facility], I turned over care of the patient to ED attending Dr. Shanelle Clayton. CRITICAL CARE TIME   CRITICAL CARE: There was a high probability of clinically significant and/or life threatening deterioration in this patient's condition which required my urgent intervention. I independently provided 10 minutes of non-concurrent critical care out of the total shared critical care time provided. This excludes any time for separately reportable procedures. FINAL IMPRESSION      1. Gastrointestinal anastomotic leak    2. S/P gastric sleeve procedure          DISPOSITION/PLAN   DISPOSITION        PATIENT REFERRED TO:  No follow-up provider specified.     DISCHARGE MEDICATIONS:  New Prescriptions    No medications on file       DISCONTINUED MEDICATIONS:  Discontinued Medications    No medications on file            (Please note that portions of this note were completed with a voice recognition program.  Efforts were made to edit the dictations but occasionally words are mis-transcribed.)    DEXTER Orlando (electronically signed)        Esha Orlando  07/05/22 [address], [place]  07/05/22 0844

## 2022-07-05 ENCOUNTER — APPOINTMENT (OUTPATIENT)
Dept: CT IMAGING | Age: 62
DRG: 393 | End: 2022-07-05
Payer: MEDICARE

## 2022-07-05 PROBLEM — K91.89 GASTRIC ANASTOMOTIC LEAK: Status: ACTIVE | Noted: 2022-07-05

## 2022-07-05 LAB
ANION GAP SERPL CALCULATED.3IONS-SCNC: 18 MMOL/L (ref 3–16)
BASOPHILS ABSOLUTE: 0.1 K/UL (ref 0–0.2)
BASOPHILS RELATIVE PERCENT: 0.5 %
BUN BLDV-MCNC: 14 MG/DL (ref 7–20)
CALCIUM SERPL-MCNC: 8.5 MG/DL (ref 8.3–10.6)
CHLORIDE BLD-SCNC: 105 MMOL/L (ref 99–110)
CO2: 18 MMOL/L (ref 21–32)
CREAT SERPL-MCNC: 0.7 MG/DL (ref 0.6–1.2)
EOSINOPHILS ABSOLUTE: 0.1 K/UL (ref 0–0.6)
EOSINOPHILS RELATIVE PERCENT: 0.5 %
GFR AFRICAN AMERICAN: >60
GFR NON-AFRICAN AMERICAN: >60
GLUCOSE BLD-MCNC: 75 MG/DL (ref 70–99)
HCT VFR BLD CALC: 33.7 % (ref 36–48)
HEMOGLOBIN: 11.2 G/DL (ref 12–16)
LACTIC ACID: 1.2 MMOL/L (ref 0.4–2)
LYMPHOCYTES ABSOLUTE: 1.6 K/UL (ref 1–5.1)
LYMPHOCYTES RELATIVE PERCENT: 12.2 %
MAGNESIUM: 1.9 MG/DL (ref 1.8–2.4)
MCH RBC QN AUTO: 29.6 PG (ref 26–34)
MCHC RBC AUTO-ENTMCNC: 33.2 G/DL (ref 31–36)
MCV RBC AUTO: 89.2 FL (ref 80–100)
MONOCYTES ABSOLUTE: 0.8 K/UL (ref 0–1.3)
MONOCYTES RELATIVE PERCENT: 6 %
NEUTROPHILS ABSOLUTE: 10.5 K/UL (ref 1.7–7.7)
NEUTROPHILS RELATIVE PERCENT: 80.8 %
PDW BLD-RTO: 14.7 % (ref 12.4–15.4)
PHOSPHORUS: 2.4 MG/DL (ref 2.5–4.9)
PLATELET # BLD: 556 K/UL (ref 135–450)
PMV BLD AUTO: 7.2 FL (ref 5–10.5)
POTASSIUM SERPL-SCNC: 3.5 MMOL/L (ref 3.5–5.1)
RBC # BLD: 3.78 M/UL (ref 4–5.2)
SODIUM BLD-SCNC: 141 MMOL/L (ref 136–145)
WBC # BLD: 13.1 K/UL (ref 4–11)

## 2022-07-05 PROCEDURE — 2580000003 HC RX 258: Performed by: EMERGENCY MEDICINE

## 2022-07-05 PROCEDURE — 6370000000 HC RX 637 (ALT 250 FOR IP): Performed by: STUDENT IN AN ORGANIZED HEALTH CARE EDUCATION/TRAINING PROGRAM

## 2022-07-05 PROCEDURE — 6360000002 HC RX W HCPCS: Performed by: EMERGENCY MEDICINE

## 2022-07-05 PROCEDURE — C9113 INJ PANTOPRAZOLE SODIUM, VIA: HCPCS | Performed by: SURGERY

## 2022-07-05 PROCEDURE — 36415 COLL VENOUS BLD VENIPUNCTURE: CPT

## 2022-07-05 PROCEDURE — 96375 TX/PRO/DX INJ NEW DRUG ADDON: CPT

## 2022-07-05 PROCEDURE — 80048 BASIC METABOLIC PNL TOTAL CA: CPT

## 2022-07-05 PROCEDURE — 1200000000 HC SEMI PRIVATE

## 2022-07-05 PROCEDURE — 6360000002 HC RX W HCPCS: Performed by: SURGERY

## 2022-07-05 PROCEDURE — 6360000004 HC RX CONTRAST MEDICATION: Performed by: PHYSICIAN ASSISTANT

## 2022-07-05 PROCEDURE — 2580000003 HC RX 258: Performed by: SURGERY

## 2022-07-05 PROCEDURE — 84100 ASSAY OF PHOSPHORUS: CPT

## 2022-07-05 PROCEDURE — 6370000000 HC RX 637 (ALT 250 FOR IP): Performed by: SURGERY

## 2022-07-05 PROCEDURE — 83735 ASSAY OF MAGNESIUM: CPT

## 2022-07-05 PROCEDURE — 83605 ASSAY OF LACTIC ACID: CPT

## 2022-07-05 PROCEDURE — 96365 THER/PROPH/DIAG IV INF INIT: CPT

## 2022-07-05 PROCEDURE — 74177 CT ABD & PELVIS W/CONTRAST: CPT

## 2022-07-05 PROCEDURE — 85025 COMPLETE CBC W/AUTO DIFF WBC: CPT

## 2022-07-05 PROCEDURE — 2580000003 HC RX 258: Performed by: PHYSICIAN ASSISTANT

## 2022-07-05 RX ORDER — ALBUTEROL SULFATE 90 UG/1
2 AEROSOL, METERED RESPIRATORY (INHALATION) EVERY 4 HOURS PRN
Status: DISCONTINUED | OUTPATIENT
Start: 2022-07-05 | End: 2022-07-08 | Stop reason: HOSPADM

## 2022-07-05 RX ORDER — MAGNESIUM SULFATE IN WATER 40 MG/ML
2000 INJECTION, SOLUTION INTRAVENOUS PRN
Status: DISCONTINUED | OUTPATIENT
Start: 2022-07-05 | End: 2022-07-08 | Stop reason: HOSPADM

## 2022-07-05 RX ORDER — SODIUM CHLORIDE 0.9 % (FLUSH) 0.9 %
5-40 SYRINGE (ML) INJECTION EVERY 12 HOURS SCHEDULED
Status: DISCONTINUED | OUTPATIENT
Start: 2022-07-05 | End: 2022-07-08 | Stop reason: HOSPADM

## 2022-07-05 RX ORDER — SODIUM CHLORIDE 0.9 % (FLUSH) 0.9 %
5-40 SYRINGE (ML) INJECTION PRN
Status: DISCONTINUED | OUTPATIENT
Start: 2022-07-05 | End: 2022-07-08 | Stop reason: HOSPADM

## 2022-07-05 RX ORDER — ACETAMINOPHEN 500 MG
500 TABLET ORAL ONCE
Status: COMPLETED | OUTPATIENT
Start: 2022-07-05 | End: 2022-07-05

## 2022-07-05 RX ORDER — MORPHINE SULFATE 4 MG/ML
4 INJECTION, SOLUTION INTRAMUSCULAR; INTRAVENOUS ONCE
Status: COMPLETED | OUTPATIENT
Start: 2022-07-05 | End: 2022-07-05

## 2022-07-05 RX ORDER — LORAZEPAM 0.5 MG/1
0.5 TABLET ORAL 2 TIMES DAILY PRN
Status: DISCONTINUED | OUTPATIENT
Start: 2022-07-05 | End: 2022-07-07

## 2022-07-05 RX ORDER — NICOTINE 21 MG/24HR
1 PATCH, TRANSDERMAL 24 HOURS TRANSDERMAL ONCE
Status: COMPLETED | OUTPATIENT
Start: 2022-07-05 | End: 2022-07-06

## 2022-07-05 RX ORDER — HYDROCODONE BITARTRATE AND ACETAMINOPHEN 5; 325 MG/1; MG/1
1 TABLET ORAL ONCE
Status: COMPLETED | OUTPATIENT
Start: 2022-07-05 | End: 2022-07-05

## 2022-07-05 RX ORDER — ONDANSETRON 2 MG/ML
4 INJECTION INTRAMUSCULAR; INTRAVENOUS EVERY 6 HOURS PRN
Status: DISCONTINUED | OUTPATIENT
Start: 2022-07-05 | End: 2022-07-08 | Stop reason: HOSPADM

## 2022-07-05 RX ORDER — 0.9 % SODIUM CHLORIDE 0.9 %
500 INTRAVENOUS SOLUTION INTRAVENOUS ONCE
Status: COMPLETED | OUTPATIENT
Start: 2022-07-05 | End: 2022-07-05

## 2022-07-05 RX ORDER — ACETAMINOPHEN 325 MG/1
650 TABLET ORAL EVERY 4 HOURS PRN
Status: DISCONTINUED | OUTPATIENT
Start: 2022-07-05 | End: 2022-07-08 | Stop reason: HOSPADM

## 2022-07-05 RX ORDER — ENOXAPARIN SODIUM 100 MG/ML
40 INJECTION SUBCUTANEOUS DAILY
Status: DISCONTINUED | OUTPATIENT
Start: 2022-07-06 | End: 2022-07-08 | Stop reason: HOSPADM

## 2022-07-05 RX ORDER — OXYCODONE HYDROCHLORIDE 5 MG/1
5 TABLET ORAL EVERY 4 HOURS PRN
Status: DISCONTINUED | OUTPATIENT
Start: 2022-07-05 | End: 2022-07-08 | Stop reason: HOSPADM

## 2022-07-05 RX ORDER — ZOLPIDEM TARTRATE 5 MG/1
5 TABLET ORAL NIGHTLY PRN
Status: DISCONTINUED | OUTPATIENT
Start: 2022-07-05 | End: 2022-07-07

## 2022-07-05 RX ORDER — ONDANSETRON 2 MG/ML
4 INJECTION INTRAMUSCULAR; INTRAVENOUS ONCE
Status: COMPLETED | OUTPATIENT
Start: 2022-07-05 | End: 2022-07-05

## 2022-07-05 RX ORDER — POTASSIUM CHLORIDE 7.45 MG/ML
10 INJECTION INTRAVENOUS PRN
Status: DISCONTINUED | OUTPATIENT
Start: 2022-07-05 | End: 2022-07-08 | Stop reason: HOSPADM

## 2022-07-05 RX ORDER — OXYCODONE HYDROCHLORIDE 10 MG/1
10 TABLET ORAL EVERY 4 HOURS PRN
Status: DISCONTINUED | OUTPATIENT
Start: 2022-07-05 | End: 2022-07-08 | Stop reason: HOSPADM

## 2022-07-05 RX ORDER — SODIUM CHLORIDE 9 MG/ML
INJECTION, SOLUTION INTRAVENOUS CONTINUOUS
Status: DISCONTINUED | OUTPATIENT
Start: 2022-07-05 | End: 2022-07-08 | Stop reason: HOSPADM

## 2022-07-05 RX ORDER — SODIUM CHLORIDE 9 MG/ML
INJECTION, SOLUTION INTRAVENOUS PRN
Status: DISCONTINUED | OUTPATIENT
Start: 2022-07-05 | End: 2022-07-08 | Stop reason: HOSPADM

## 2022-07-05 RX ADMIN — ONDANSETRON 4 MG: 2 INJECTION INTRAMUSCULAR; INTRAVENOUS at 14:31

## 2022-07-05 RX ADMIN — ACETAMINOPHEN 500 MG: 500 TABLET ORAL at 03:24

## 2022-07-05 RX ADMIN — SODIUM CHLORIDE 80 MG: 9 INJECTION, SOLUTION INTRAVENOUS at 21:15

## 2022-07-05 RX ADMIN — OXYCODONE HYDROCHLORIDE 10 MG: 10 TABLET ORAL at 22:07

## 2022-07-05 RX ADMIN — HYDROCODONE BITARTRATE AND ACETAMINOPHEN 1 TABLET: 5; 325 TABLET ORAL at 02:48

## 2022-07-05 RX ADMIN — SODIUM CHLORIDE 500 ML: 9 INJECTION, SOLUTION INTRAVENOUS at 07:50

## 2022-07-05 RX ADMIN — ACETAMINOPHEN 650 MG: 325 TABLET ORAL at 22:08

## 2022-07-05 RX ADMIN — PIPERACILLIN AND TAZOBACTAM 3375 MG: 3; .375 INJECTION, POWDER, LYOPHILIZED, FOR SOLUTION INTRAVENOUS at 14:13

## 2022-07-05 RX ADMIN — SODIUM CHLORIDE: 9 INJECTION, SOLUTION INTRAVENOUS at 14:37

## 2022-07-05 RX ADMIN — IOHEXOL 50 ML: 240 INJECTION, SOLUTION INTRATHECAL; INTRAVASCULAR; INTRAVENOUS; ORAL at 20:25

## 2022-07-05 RX ADMIN — SODIUM CHLORIDE: 9 INJECTION, SOLUTION INTRAVENOUS at 21:13

## 2022-07-05 RX ADMIN — MORPHINE SULFATE 4 MG: 4 INJECTION, SOLUTION INTRAMUSCULAR; INTRAVENOUS at 14:31

## 2022-07-05 RX ADMIN — SODIUM CHLORIDE: 9 INJECTION, SOLUTION INTRAVENOUS at 00:43

## 2022-07-05 RX ADMIN — SODIUM CHLORIDE 8 MG/HR: 9 INJECTION, SOLUTION INTRAVENOUS at 21:52

## 2022-07-05 ASSESSMENT — PAIN SCALES - GENERAL
PAINLEVEL_OUTOF10: 10
PAINLEVEL_OUTOF10: 9
PAINLEVEL_OUTOF10: 7
PAINLEVEL_OUTOF10: 9
PAINLEVEL_OUTOF10: 0

## 2022-07-05 ASSESSMENT — PAIN DESCRIPTION - PAIN TYPE: TYPE: ACUTE PAIN

## 2022-07-05 ASSESSMENT — PAIN DESCRIPTION - LOCATION
LOCATION: HEAD

## 2022-07-05 ASSESSMENT — PAIN DESCRIPTION - ONSET: ONSET: ON-GOING

## 2022-07-05 ASSESSMENT — PAIN DESCRIPTION - DESCRIPTORS
DESCRIPTORS: ACHING
DESCRIPTORS: ACHING

## 2022-07-05 ASSESSMENT — PAIN - FUNCTIONAL ASSESSMENT
PAIN_FUNCTIONAL_ASSESSMENT: 0-10
PAIN_FUNCTIONAL_ASSESSMENT: PREVENTS OR INTERFERES SOME ACTIVE ACTIVITIES AND ADLS

## 2022-07-05 ASSESSMENT — PAIN DESCRIPTION - FREQUENCY: FREQUENCY: CONTINUOUS

## 2022-07-05 NOTE — ED NOTES
Called access center for bed update. At this time there is not beds available for they are still discharge dependent. RN made aware.       [facility]  07/05/22 1125

## 2022-07-05 NOTE — ED NOTES
ED SBAR report to Sundeep Chamberlain. Also spoke with Dr. Stefanie Seaman about patient's Bps dropping to 26X systolic. Orders placed for a 500 ml fluid bolus, oncoming RN notified of this.       Eugene Lovell RN  07/05/22 8829

## 2022-07-05 NOTE — ED NOTES
Placed call to access [facility] for bed update. [address] [place] [address] states that they can not give me a time that the patient will be getting a bed. Dr. Dontae Arora made aware, he states to call Dr. Emily Mathias back with general surgery and explain the situation. Dr. Dontae Arora spoke with surgery and he states that Dr. Emily Mathias wants us to call Bariatric Surgery over at [address] [place] [address] to speak with them again about getting the patient transferred to their ER. Made the call to access center, to have them page the bariatric surgeon so we can speak with them.        Rajni MARINO  07/05/22 8533

## 2022-07-05 NOTE — ED PROVIDER NOTES
1400: Call from Dr. Stephen Serrano at [facility]. She requested every 8 hours Zosyn and continued maintenance fluids. Still waiting on a bed assignment for transfer. Patient's heart rates in the 76s. Her blood pressure is in the 382-618 systolic range. Zosyn ordered per her request.  Maintenance fluids increased from 100 cc an hour to 150 cc an hour. 1615: Informed that there is still no beds available at [facility], still discharge dependent. Contacted general surgery, Dr. Renetta Johnson. Per our discussion I am going to contact the bariatric surgeon at [facility] to discuss whether or not a ED to ED transfer is an option so that they can evaluate this patient. 1710: Discussed the case with Dr. Amira Bradford, bariatric surgery at [facility]. He is willing to do an ED to ED transfer, but bed control told him that that was not an option, that they were discharged dependent and at capacity and could only accept trauma, burns, OB, and stroke patient. 1730: Discussed with Dr. Renetta Johnson. Per our discussion I reevaluated the patient. She continues to have significant pain mainly in her upper abdomen. Her vital signs are as documented. Her heart shows a regular rate and rhythm. Her lungs are clear. She has diffuse upper abdominal tenderness mainly in the epigastrium with guarding but no rebound. Dr. Renetta Johnson will put in admitting orders.      Gini Brooks MD  07/05/22 [address] [place] [address], MD  07/05/22 8021

## 2022-07-05 NOTE — ED NOTES
Access center called back with [facility] on the phone to speak with Dr. Noel Mahajan  07/05/22 [phone]

## 2022-07-05 NOTE — ED NOTES
Pt is still awaiting transfer to . Denies any needs at this time.       Tristan Kapoor RN  07/05/22 2464

## 2022-07-05 NOTE — ED NOTES
Pt was incontinent of urine and asked for a change of clothes. Pt was helped to the restroom and changed into a gown and clean up.  Denies any other needs at this time       Sharron Alexis RN  07/05/22 3961

## 2022-07-05 NOTE — ED NOTES
Patient resting in bed with eyes closed, respirations are even and easy, no distress noted.      Paulina Santiago RN  07/05/22 9292

## 2022-07-06 LAB
ANION GAP SERPL CALCULATED.3IONS-SCNC: 17 MMOL/L (ref 3–16)
BASOPHILS ABSOLUTE: 0 K/UL (ref 0–0.2)
BASOPHILS RELATIVE PERCENT: 0.2 %
BUN BLDV-MCNC: 11 MG/DL (ref 7–20)
CALCIUM SERPL-MCNC: 8.1 MG/DL (ref 8.3–10.6)
CHLORIDE BLD-SCNC: 107 MMOL/L (ref 99–110)
CO2: 17 MMOL/L (ref 21–32)
CREAT SERPL-MCNC: 0.7 MG/DL (ref 0.6–1.2)
EOSINOPHILS ABSOLUTE: 0.1 K/UL (ref 0–0.6)
EOSINOPHILS RELATIVE PERCENT: 0.7 %
GFR AFRICAN AMERICAN: >60
GFR NON-AFRICAN AMERICAN: >60
GLUCOSE BLD-MCNC: 77 MG/DL (ref 70–99)
HCT VFR BLD CALC: 28.2 % (ref 36–48)
HEMOGLOBIN: 9.3 G/DL (ref 12–16)
LACTIC ACID: 0.9 MMOL/L (ref 0.4–2)
LYMPHOCYTES ABSOLUTE: 1.8 K/UL (ref 1–5.1)
LYMPHOCYTES RELATIVE PERCENT: 15.7 %
MAGNESIUM: 2 MG/DL (ref 1.8–2.4)
MCH RBC QN AUTO: 29.6 PG (ref 26–34)
MCHC RBC AUTO-ENTMCNC: 32.9 G/DL (ref 31–36)
MCV RBC AUTO: 89.9 FL (ref 80–100)
MONOCYTES ABSOLUTE: 0.8 K/UL (ref 0–1.3)
MONOCYTES RELATIVE PERCENT: 6.9 %
NEUTROPHILS ABSOLUTE: 8.9 K/UL (ref 1.7–7.7)
NEUTROPHILS RELATIVE PERCENT: 76.5 %
PDW BLD-RTO: 14.8 % (ref 12.4–15.4)
PHOSPHORUS: 2.6 MG/DL (ref 2.5–4.9)
PLATELET # BLD: 501 K/UL (ref 135–450)
PMV BLD AUTO: 7 FL (ref 5–10.5)
POTASSIUM REFLEX MAGNESIUM: 3.4 MMOL/L (ref 3.5–5.1)
RBC # BLD: 3.13 M/UL (ref 4–5.2)
SODIUM BLD-SCNC: 141 MMOL/L (ref 136–145)
WBC # BLD: 11.7 K/UL (ref 4–11)

## 2022-07-06 PROCEDURE — 6370000000 HC RX 637 (ALT 250 FOR IP): Performed by: SURGERY

## 2022-07-06 PROCEDURE — 94640 AIRWAY INHALATION TREATMENT: CPT

## 2022-07-06 PROCEDURE — 6370000000 HC RX 637 (ALT 250 FOR IP): Performed by: NURSE PRACTITIONER

## 2022-07-06 PROCEDURE — 83735 ASSAY OF MAGNESIUM: CPT

## 2022-07-06 PROCEDURE — 2580000003 HC RX 258: Performed by: SURGERY

## 2022-07-06 PROCEDURE — APPNB45 APP NON BILLABLE 31-45 MINUTES: Performed by: NURSE PRACTITIONER

## 2022-07-06 PROCEDURE — 85025 COMPLETE CBC W/AUTO DIFF WBC: CPT

## 2022-07-06 PROCEDURE — 99222 1ST HOSP IP/OBS MODERATE 55: CPT | Performed by: SURGERY

## 2022-07-06 PROCEDURE — 6360000002 HC RX W HCPCS: Performed by: SURGERY

## 2022-07-06 PROCEDURE — 1200000000 HC SEMI PRIVATE

## 2022-07-06 PROCEDURE — 80048 BASIC METABOLIC PNL TOTAL CA: CPT

## 2022-07-06 PROCEDURE — 36415 COLL VENOUS BLD VENIPUNCTURE: CPT

## 2022-07-06 PROCEDURE — APPSS15 APP SPLIT SHARED TIME 0-15 MINUTES: Performed by: NURSE PRACTITIONER

## 2022-07-06 PROCEDURE — 84100 ASSAY OF PHOSPHORUS: CPT

## 2022-07-06 PROCEDURE — C9113 INJ PANTOPRAZOLE SODIUM, VIA: HCPCS | Performed by: SURGERY

## 2022-07-06 PROCEDURE — 94760 N-INVAS EAR/PLS OXIMETRY 1: CPT

## 2022-07-06 PROCEDURE — 83605 ASSAY OF LACTIC ACID: CPT

## 2022-07-06 RX ADMIN — PIPERACILLIN AND TAZOBACTAM 3375 MG: 3; .375 INJECTION, POWDER, LYOPHILIZED, FOR SOLUTION INTRAVENOUS at 18:45

## 2022-07-06 RX ADMIN — PIPERACILLIN AND TAZOBACTAM 3375 MG: 3; .375 INJECTION, POWDER, LYOPHILIZED, FOR SOLUTION INTRAVENOUS at 02:22

## 2022-07-06 RX ADMIN — MOMETASONE FUROATE AND FORMOTEROL FUMARATE DIHYDRATE 2 PUFF: 200; 5 AEROSOL RESPIRATORY (INHALATION) at 07:49

## 2022-07-06 RX ADMIN — OXYCODONE 5 MG: 5 TABLET ORAL at 14:20

## 2022-07-06 RX ADMIN — TIOTROPIUM BROMIDE INHALATION SPRAY 2 PUFF: 3.12 SPRAY, METERED RESPIRATORY (INHALATION) at 07:49

## 2022-07-06 RX ADMIN — SODIUM CHLORIDE: 9 INJECTION, SOLUTION INTRAVENOUS at 18:44

## 2022-07-06 RX ADMIN — MOMETASONE FUROATE AND FORMOTEROL FUMARATE DIHYDRATE 2 PUFF: 200; 5 AEROSOL RESPIRATORY (INHALATION) at 21:16

## 2022-07-06 RX ADMIN — SODIUM CHLORIDE: 9 INJECTION, SOLUTION INTRAVENOUS at 04:13

## 2022-07-06 RX ADMIN — SODIUM CHLORIDE 8 MG/HR: 9 INJECTION, SOLUTION INTRAVENOUS at 19:56

## 2022-07-06 RX ADMIN — OXYCODONE HYDROCHLORIDE 10 MG: 10 TABLET ORAL at 08:31

## 2022-07-06 RX ADMIN — OXYCODONE HYDROCHLORIDE 10 MG: 10 TABLET ORAL at 20:00

## 2022-07-06 RX ADMIN — ZOLPIDEM TARTRATE 5 MG: 5 TABLET ORAL at 21:30

## 2022-07-06 RX ADMIN — ACETAMINOPHEN 650 MG: 325 TABLET ORAL at 16:07

## 2022-07-06 RX ADMIN — SODIUM CHLORIDE, PRESERVATIVE FREE 10 ML: 5 INJECTION INTRAVENOUS at 08:38

## 2022-07-06 RX ADMIN — ENOXAPARIN SODIUM 40 MG: 100 INJECTION SUBCUTANEOUS at 08:31

## 2022-07-06 RX ADMIN — SODIUM CHLORIDE 8 MG/HR: 9 INJECTION, SOLUTION INTRAVENOUS at 10:24

## 2022-07-06 RX ADMIN — SODIUM CHLORIDE: 9 INJECTION, SOLUTION INTRAVENOUS at 02:21

## 2022-07-06 RX ADMIN — PIPERACILLIN AND TAZOBACTAM 3375 MG: 3; .375 INJECTION, POWDER, LYOPHILIZED, FOR SOLUTION INTRAVENOUS at 10:49

## 2022-07-06 ASSESSMENT — PAIN - FUNCTIONAL ASSESSMENT
PAIN_FUNCTIONAL_ASSESSMENT: PREVENTS OR INTERFERES SOME ACTIVE ACTIVITIES AND ADLS
PAIN_FUNCTIONAL_ASSESSMENT: ACTIVITIES ARE NOT PREVENTED

## 2022-07-06 ASSESSMENT — PAIN DESCRIPTION - DESCRIPTORS
DESCRIPTORS: ACHING
DESCRIPTORS: ACHING
DESCRIPTORS: POUNDING
DESCRIPTORS: ACHING;SHARP

## 2022-07-06 ASSESSMENT — PAIN DESCRIPTION - LOCATION
LOCATION: HEAD

## 2022-07-06 ASSESSMENT — PAIN SCALES - GENERAL
PAINLEVEL_OUTOF10: 7
PAINLEVEL_OUTOF10: 7
PAINLEVEL_OUTOF10: 9
PAINLEVEL_OUTOF10: 9

## 2022-07-06 ASSESSMENT — PAIN DESCRIPTION - PAIN TYPE
TYPE: ACUTE PAIN
TYPE: ACUTE PAIN

## 2022-07-06 ASSESSMENT — PAIN DESCRIPTION - FREQUENCY
FREQUENCY: CONTINUOUS
FREQUENCY: CONTINUOUS

## 2022-07-06 ASSESSMENT — PAIN DESCRIPTION - ONSET
ONSET: ON-GOING
ONSET: ON-GOING

## 2022-07-06 ASSESSMENT — PAIN DESCRIPTION - ORIENTATION
ORIENTATION: POSTERIOR
ORIENTATION: RIGHT;LEFT;MID

## 2022-07-06 NOTE — PLAN OF CARE
Problem: Discharge Planning  Goal: Discharge to home or other facility with appropriate resources  Outcome: Progressing     Problem: Safety - Adult  Goal: Free from fall injury  Outcome: Progressing  Flowsheets (Taken 7/6/2022 1510)  Free From Fall Injury: Instruct family/caregiver on patient safety     Problem: Pain  Goal: Verbalizes/displays adequate comfort level or baseline comfort level  Outcome: Progressing

## 2022-07-06 NOTE — ED NOTES
Report called to nathan rn , pt going to rm [id number] - 4w.  Pt in ct at this time     Meng Collins RN  07/05/22 2016

## 2022-07-06 NOTE — ACP (ADVANCE CARE PLANNING)
Advance Care Planning     Advance Care Planning Activator (Inpatient)  Conversation Note      Date of ACP Conversation: 7/6/2022     Conversation Conducted with: Patient with Decision Making Capacity    ACP Activator: Curly Gomez RN    Health Care Decision Maker:     Current Designated Health Care Decision Maker:     Primary Decision Maker: Naty Mina Child - [phone]    Care Preferences    Ventilation: \"If you were in your present state of health and suddenly became very ill and were unable to breathe on your own, what would your preference be about the use of a ventilator (breathing machine) if it were available to you? \"      Would the patient desire the use of ventilator (breathing machine)?: yes    \"If your health worsens and it becomes clear that your chance of recovery is unlikely, what would your preference be about the use of a ventilator (breathing machine) if it were available to you? \"     Would the patient desire the use of ventilator (breathing machine)?: Yes      Resuscitation  \"CPR works best to restart the heart when there is a sudden event, like a heart attack, in someone who is otherwise healthy. Unfortunately, CPR does not typically restart the heart for people who have serious health conditions or who are very sick. \"    \"In the event your heart stopped as a result of an underlying serious health condition, would you want attempts to be made to restart your heart (answer \"yes\" for attempt to resuscitate) or would you prefer a natural death (answer \"no\" for do not attempt to resuscitate)? \" yes       [] Yes   [x] No   Educated Patient / Dewayne Ferrera regarding differences between Advance Directives and portable DNR orders.     Length of ACP Conversation in minutes:  3    Conversation Outcomes:  [x] ACP discussion completed  [] Existing advance directive reviewed with patient; no changes to patient's previously recorded wishes  [] New Advance Directive completed  [] Portable Do Not

## 2022-07-06 NOTE — H&P
General Surgery   History and Physical Note       Angelica Chandler   : 1960 MRN: [id number]  Date of Admission: 2022  Admitting Gorge Andres MD  Primary Care Physician: Fabiana Ybarra DO    Chief complaint:  Abdominal pain    Diagnosis Present on Admission:   Gastric anastomotic leak      History of Present Illness  Lexus Huerta is a 63 y.o. female admitted on 2022 for abdominal pain. Hx gastric sleeve 6181 complicated by anastomotic leak and abscess. She presents now with 3 weeks history of pain similar to that episode. Has had difficulty keeping food and fluids down and tried to treat herself at home but felt it was time to come in.   CT gastric sleeve anatomy with increased extraluminal gas within the left aspect of the gastric cardia. No contrast extrav seen on CT with oral contrast.       Past Medical History:   Diagnosis Date    Anxiety     Depression      Past Surgical History:   Procedure Laterality Date    STOMACH SURGERY      gastric sleeve     History reviewed. No pertinent family history. Social History     Socioeconomic History    Marital status:       Spouse name: Not on file    Number of children: Not on file    Years of education: Not on file    Highest education level: Not on file   Occupational History    Not on file   Tobacco Use    Smoking status: Current Every Day Smoker     Types: Cigarettes    Smokeless tobacco: Never Used    Tobacco comment: trying to quit   Vaping Use    Vaping Use: Never used   Substance and Sexual Activity    Alcohol use: No    Drug use: No    Sexual activity: Not Currently   Other Topics Concern    Not on file   Social History Narrative    Not on file     Social Determinants of Health     Financial Resource Strain:     Difficulty of Paying Living Expenses: Not on file   Food Insecurity:     Worried About Running Out of Food in the Last Year: Not on file    Brown of Food in the Last Year: Not on Historical Provider, MD   LORazepam (ATIVAN) 0.5 MG tablet TAKE 1 TABLET BY MOUTH TWO TIMES A DAY 6/28/17   Historical Provider, MD   omeprazole (PRILOSEC) 20 MG delayed release capsule Take 20 mg by mouth    Historical Provider, MD   tiotropium (SPIRIVA RESPIMAT) 2.5 MCG/ACT AERS inhaler  7/10/17   Historical Provider, MD   traMADol (ULTRAM) 50 MG tablet Take 50 mg by mouth 5/18/17   Historical Provider, MD   albuterol sulfate HFA (VENTOLIN HFA) 108 (90 Base) MCG/ACT inhaler  7/10/17   Historical Provider, MD   zolpidem (AMBIEN) 5 MG tablet TAKE 1 TABLET BY MOUTH AT BEDTIME 5/27/17   Historical Provider, MD   Fluticasone Furoate-Vilanterol (BREO ELLIPTA) 200-25 MCG/INH AEPB Inhale into the lungs    Historical Provider, MD   varenicline (CHANTIX) 1 MG tablet Take 1 mg by mouth 2 times daily    Historical Provider, MD       Review of Systems  12 point review of systems was reviewed and negative except for that listed in HPI    Physical Exam  Vitals:    07/05/22 2309 07/06/22 0604 07/06/22 0749 07/06/22 0815   BP:  113/65  112/74   Pulse:  73  81   Resp:  18  18   Temp: 98.8 °F (37.1 °C) 98.4 °F (36.9 °C)  99 °F (37.2 °C)   TempSrc: Oral Oral  Oral   SpO2:  96% 91% 92%   Weight:       Height:             Intake/Output Summary (Last 24 hours) at 7/6/2022 1144  Last data filed at 7/6/2022 0240  Gross per 24 hour   Intake 2968.94 ml   Output --   Net 2968.94 ml       Body mass index is 31.17 kg/m². General Appearance: alert, well appearing, and in no distress and chronically ill appearing   HEENT: NCAT, PERRLA, Conjunctiva non injected, no scleral icterus. External ears and nares normal bilaterally. Mucous membranes pink and moist.   Neck: Neck is symmetrical. Trachea appears midline.   Lung: Clear to auscultation bilaterally, normal rate and effort   Cardiac: Regular rate and rhythm, S1S2 present, without murmur   Abdomen: Soft, epigastric tenderness, scars c/w surgical history   Neuro: No gross motor or sensory deficits. Skin: No open wounds or rashes. MSK: normal ROM, no edema  Psych: normal insight, judgment    Labs  Recent Labs     07/04/22 1852 07/05/22 1934   WBC 15.4* 13.1*   HGB 11.6* 11.2*   HCT 34.6* 33.7*   * 556*     Recent Labs     07/04/22 1852 07/05/22 1934    141   K 3.7 3.5    105   CO2 25 18*   BUN 17 14   GFRAA >60 >60   MG  --  1.90   PHOS  --  2.4*     Recent Labs     07/04/22 1852   AST 9*   ALT 7*     No results for input(s): UOSM in the last 72 hours. Invalid input(s): UAPR, UCOL, [facility], [place], [facility], [place], [place], [facility]    Imaging  CT ABDOMEN PELVIS W IV CONTRAST Additional Contrast? Oral   Final Result   1. Postsurgical changes of gastric sleeve. There is increased extraluminal   gas within the collection along the left aspect of the gastric cardia which   appears to be in direct continuity of the splenic flexure colon and is likely   also in continuity with the gastric remnant. Similar appearance of gas and   fluid along the medial aspect of the gastric cardia from most recent prior,   decreased since 2019 CT. No visualized extraluminal enteric contrast.      2.  There is wall thickening of the splenic flexure colon in the region of   the fistula, which may be reactive although underlying neoplasm is not   excluded. Recommend colonoscopy after the resolution of acute symptoms if   not recently performed. 3.  Mildly prominent retroperitoneal and upper abdominal lymph nodes are   likely reactive. 4.  Mild intrahepatic and extrahepatic biliary ductal dilation without   visualized distal obstructing stone. Correlate with LFTs. 5.  Small left pleural effusion. CT ABDOMEN PELVIS W IV CONTRAST Additional Contrast? Oral   Final Result   Postsurgical changes again noted of gastric bypass.  Extraluminal gas locules   seen extending adjacent to the GE junction and gastrohepatic region for   example axial series 2, image 28 through 40 in similar location to previous   2019 examination and could represent components of residual or recurrent   involvement. Along the lateral surgical margin there is additional confluent   fluid signal without gas locules measuring up to 4.8 cm series 2, image 30   additionally noted. Considerations for chronic or recurrent leak associated   with postsurgical margins at the GE junction and proximal gastric region. No   focal peripherally enhancing abscess although irregular signal as detailed   may be further evaluated with enteric contrast.      RECOMMENDATIONS:   Unavailable         XR CHEST PORTABLE   Final Result   1. Small left pleural effusion, left basilar atelectasis   2. Trace right pleural effusion              Assessment  Nina Borden is a 63 y.o. female with PSH gastric sleeve 6/8/8791 complicated by partha-gastric abscess managed with IV abx, perc drain and gastric stent  admitted for three week history of abdominal pain. Plan    1. Gastric anastomotic leak  · Attempted to have her transferred to [facility] where her surgeon is but no beds available currently. She is on the waitlist and the plan remains to transfer her there when one available  · In the meantime continue supportive care, NPO, IVF, antibiotics. · Further recommendations per Dr. Emily Mathias      Electronically signed by KENNETH Melara CNP on 7/6/22 at 11:44 AM EDT     Agree with above note. The patient was personally seen and examined. Eusebio Jones is a 64 yo female with a history of sleeve gastrectomy in 2019 at [facility] complicated by leak/fistula which required stent placement. She lost approximately 90 lbs after her sleeve gastrectomy and as she dealt with her fistula. She has not seen a bariatric surgeon or weight management in a while. Over the past 3 weeks, she began to have nausea and vomiting with epigastric pain starting 2 weeks ago. She did have some fevers at home which led her to come in to be evaluated.       NAD, appears stated age  Normal respiratory effort, no accessory muscle use  Abd soft, moderate epigastric tenderness without peritonitis, ND  Ext no cyanosis or clubbing    WBC 11.7, down from 15.4 on admission  Cr 0.7  Lactic acid 0.9    CT abd/pelvis with extraluminal gas at GE junction with fluid to the left of the stomach near the adjacent splenic flexure of the colon. No active extravasation with repeat CT with oral contrast    A/P: 64 yo female with gastric perforation/leak at prior fistula site. NPO.   Patient did not tolerate NG insertion  IV hydration  Zosyn IV  Plan to transfer when bed available to [address] [place] [address] who has accepted a transfer    Eugenio Antonio MD

## 2022-07-07 VITALS
BODY MASS INDEX: 33.56 KG/M2 | RESPIRATION RATE: 18 BRPM | HEIGHT: 59 IN | TEMPERATURE: 99.6 F | DIASTOLIC BLOOD PRESSURE: 58 MMHG | SYSTOLIC BLOOD PRESSURE: 116 MMHG | HEART RATE: 90 BPM | OXYGEN SATURATION: 93 % | WEIGHT: 166.45 LBS

## 2022-07-07 LAB
ANION GAP SERPL CALCULATED.3IONS-SCNC: 15 MMOL/L (ref 3–16)
BASOPHILS ABSOLUTE: 0 K/UL (ref 0–0.2)
BASOPHILS RELATIVE PERCENT: 0.4 %
BUN BLDV-MCNC: 7 MG/DL (ref 7–20)
CALCIUM SERPL-MCNC: 7.9 MG/DL (ref 8.3–10.6)
CHLORIDE BLD-SCNC: 105 MMOL/L (ref 99–110)
CO2: 17 MMOL/L (ref 21–32)
CREAT SERPL-MCNC: <0.5 MG/DL (ref 0.6–1.2)
EOSINOPHILS ABSOLUTE: 0.1 K/UL (ref 0–0.6)
EOSINOPHILS RELATIVE PERCENT: 0.5 %
GFR AFRICAN AMERICAN: >60
GFR NON-AFRICAN AMERICAN: >60
GLUCOSE BLD-MCNC: 85 MG/DL (ref 70–99)
HCT VFR BLD CALC: 27.7 % (ref 36–48)
HEMOGLOBIN: 9 G/DL (ref 12–16)
LYMPHOCYTES ABSOLUTE: 1.5 K/UL (ref 1–5.1)
LYMPHOCYTES RELATIVE PERCENT: 11.4 %
MCH RBC QN AUTO: 29 PG (ref 26–34)
MCHC RBC AUTO-ENTMCNC: 32.5 G/DL (ref 31–36)
MCV RBC AUTO: 89.3 FL (ref 80–100)
MONOCYTES ABSOLUTE: 0.9 K/UL (ref 0–1.3)
MONOCYTES RELATIVE PERCENT: 6.7 %
NEUTROPHILS ABSOLUTE: 10.6 K/UL (ref 1.7–7.7)
NEUTROPHILS RELATIVE PERCENT: 81 %
PDW BLD-RTO: 14.8 % (ref 12.4–15.4)
PLATELET # BLD: 428 K/UL (ref 135–450)
PMV BLD AUTO: 7.4 FL (ref 5–10.5)
POTASSIUM REFLEX MAGNESIUM: 3.7 MMOL/L (ref 3.5–5.1)
RBC # BLD: 3.1 M/UL (ref 4–5.2)
SODIUM BLD-SCNC: 137 MMOL/L (ref 136–145)
WBC # BLD: 13 K/UL (ref 4–11)

## 2022-07-07 PROCEDURE — 99232 SBSQ HOSP IP/OBS MODERATE 35: CPT | Performed by: SURGERY

## 2022-07-07 PROCEDURE — 85025 COMPLETE CBC W/AUTO DIFF WBC: CPT

## 2022-07-07 PROCEDURE — 2580000003 HC RX 258: Performed by: SURGERY

## 2022-07-07 PROCEDURE — 94760 N-INVAS EAR/PLS OXIMETRY 1: CPT

## 2022-07-07 PROCEDURE — 80048 BASIC METABOLIC PNL TOTAL CA: CPT

## 2022-07-07 PROCEDURE — APPSS15 APP SPLIT SHARED TIME 0-15 MINUTES: Performed by: NURSE PRACTITIONER

## 2022-07-07 PROCEDURE — 6370000000 HC RX 637 (ALT 250 FOR IP): Performed by: SURGERY

## 2022-07-07 PROCEDURE — APPNB15 APP NON BILLABLE TIME 0-15 MINS: Performed by: NURSE PRACTITIONER

## 2022-07-07 PROCEDURE — 94640 AIRWAY INHALATION TREATMENT: CPT

## 2022-07-07 PROCEDURE — 6360000002 HC RX W HCPCS: Performed by: SURGERY

## 2022-07-07 PROCEDURE — 6370000000 HC RX 637 (ALT 250 FOR IP): Performed by: NURSE PRACTITIONER

## 2022-07-07 RX ORDER — ESTROGEN,CON/M-PROGEST ACET 0.3-1.5MG
1 TABLET ORAL DAILY
COMMUNITY

## 2022-07-07 RX ADMIN — PIPERACILLIN AND TAZOBACTAM 3375 MG: 3; .375 INJECTION, POWDER, LYOPHILIZED, FOR SOLUTION INTRAVENOUS at 03:02

## 2022-07-07 RX ADMIN — OXYCODONE HYDROCHLORIDE 10 MG: 10 TABLET ORAL at 04:51

## 2022-07-07 RX ADMIN — ENOXAPARIN SODIUM 40 MG: 100 INJECTION SUBCUTANEOUS at 08:11

## 2022-07-07 RX ADMIN — SODIUM CHLORIDE: 9 INJECTION, SOLUTION INTRAVENOUS at 01:21

## 2022-07-07 RX ADMIN — OXYCODONE HYDROCHLORIDE 10 MG: 10 TABLET ORAL at 08:49

## 2022-07-07 RX ADMIN — OXYCODONE HYDROCHLORIDE 10 MG: 10 TABLET ORAL at 15:06

## 2022-07-07 RX ADMIN — ACETAMINOPHEN 650 MG: 325 TABLET ORAL at 04:29

## 2022-07-07 RX ADMIN — PIPERACILLIN AND TAZOBACTAM 3375 MG: 3; .375 INJECTION, POWDER, LYOPHILIZED, FOR SOLUTION INTRAVENOUS at 12:02

## 2022-07-07 RX ADMIN — SODIUM CHLORIDE: 9 INJECTION, SOLUTION INTRAVENOUS at 08:11

## 2022-07-07 RX ADMIN — MOMETASONE FUROATE AND FORMOTEROL FUMARATE DIHYDRATE 2 PUFF: 200; 5 AEROSOL RESPIRATORY (INHALATION) at 20:48

## 2022-07-07 RX ADMIN — PIPERACILLIN AND TAZOBACTAM 3375 MG: 3; .375 INJECTION, POWDER, LYOPHILIZED, FOR SOLUTION INTRAVENOUS at 18:03

## 2022-07-07 RX ADMIN — HYDROMORPHONE HYDROCHLORIDE 1 MG: 1 INJECTION, SOLUTION INTRAMUSCULAR; INTRAVENOUS; SUBCUTANEOUS at 01:21

## 2022-07-07 RX ADMIN — ACETAMINOPHEN 650 MG: 325 TABLET ORAL at 19:29

## 2022-07-07 RX ADMIN — MOMETASONE FUROATE AND FORMOTEROL FUMARATE DIHYDRATE 2 PUFF: 200; 5 AEROSOL RESPIRATORY (INHALATION) at 08:28

## 2022-07-07 RX ADMIN — OXYCODONE HYDROCHLORIDE 10 MG: 10 TABLET ORAL at 21:05

## 2022-07-07 RX ADMIN — TIOTROPIUM BROMIDE INHALATION SPRAY 2 PUFF: 3.12 SPRAY, METERED RESPIRATORY (INHALATION) at 08:28

## 2022-07-07 ASSESSMENT — PAIN DESCRIPTION - PAIN TYPE
TYPE: ACUTE PAIN
TYPE: ACUTE PAIN

## 2022-07-07 ASSESSMENT — PAIN DESCRIPTION - ORIENTATION
ORIENTATION: POSTERIOR
ORIENTATION: ANTERIOR

## 2022-07-07 ASSESSMENT — PAIN DESCRIPTION - DESCRIPTORS
DESCRIPTORS: ACHING;DISCOMFORT
DESCRIPTORS: ACHING;HEAVINESS

## 2022-07-07 ASSESSMENT — PAIN SCALES - GENERAL
PAINLEVEL_OUTOF10: 0
PAINLEVEL_OUTOF10: 9
PAINLEVEL_OUTOF10: 6
PAINLEVEL_OUTOF10: 10
PAINLEVEL_OUTOF10: 10
PAINLEVEL_OUTOF10: 9
PAINLEVEL_OUTOF10: 9

## 2022-07-07 ASSESSMENT — PAIN DESCRIPTION - ONSET
ONSET: ON-GOING
ONSET: ON-GOING

## 2022-07-07 ASSESSMENT — PAIN - FUNCTIONAL ASSESSMENT
PAIN_FUNCTIONAL_ASSESSMENT: ACTIVITIES ARE NOT PREVENTED
PAIN_FUNCTIONAL_ASSESSMENT: ACTIVITIES ARE NOT PREVENTED

## 2022-07-07 ASSESSMENT — PAIN DESCRIPTION - LOCATION
LOCATION: CHEST
LOCATION: HEAD
LOCATION: HEAD

## 2022-07-07 ASSESSMENT — PAIN DESCRIPTION - FREQUENCY
FREQUENCY: CONTINUOUS
FREQUENCY: CONTINUOUS

## 2022-07-07 NOTE — PLAN OF CARE
Problem: Pain  Goal: Verbalizes/displays adequate comfort level or baseline comfort level  7/6/2022 2315 by Matti Boyer RN  Outcome: Progressing  7/6/2022 1511 by Harlan Lanza RN  Outcome: Progressing    Pain/discomfort being managed with PRN analgesics per MD orders. Pt able to express presence and absence of pain and rate pain appropriately using numerical scale. Non-pharmacologic comfort measures implemented. Rest and comfort promoted. Problem: ABCDS Injury Assessment  Goal: Absence of physical injury  Outcome: Progressing     Problem: Safety - Adult  Goal: Free from fall injury  7/6/2022 2315 by Matti Boyer RN  Outcome: Progressing  7/6/2022 1511 by Harlan Lanza RN  Outcome: Progressing  Flowsheets (Taken 7/6/2022 1510)  Free From Fall Injury: Instruct family/caregiver on patient safety    Patient uses call light appropriately to express needs. Bed to lowest position with door open and call light in reach. All fall precautions implemented at this time.  ails up x2. Non skid footwear in place. Seen at intervals to ensure safety. All needs attended.       Problem: Discharge Planning  Goal: Discharge to home or other facility with appropriate resources  7/6/2022 2315 by Matti Boyer RN  Outcome: Progressing  7/6/2022 1511 by Harlan Lanza RN  Outcome: Progressing

## 2022-07-07 NOTE — PLAN OF CARE
Problem: Discharge Planning  Goal: Discharge to home or other facility with appropriate resources  0/7/5657 3363 by Mansoor Jasmine RN  Outcome: Progressing  7/6/2022 2315 by Danae Pemberton RN  Outcome: Progressing     Problem: Safety - Adult  Goal: Free from fall injury  5/0/8707 6676 by Mansoor Jasmine RN  Outcome: Progressing  7/6/2022 2315 by Danae Pemberton RN  Outcome: Progressing     Problem: Pain  Goal: Verbalizes/displays adequate comfort level or baseline comfort level  2/7/5678 1226 by Mansoor Jasmine RN  Outcome: Progressing  7/6/2022 2315 by Danae Pemberton RN  Outcome: Progressing     Problem: ABCDS Injury Assessment  Goal: Absence of physical injury  1/0/6449 1392 by Mansoor Jasmine RN  Outcome: Progressing  7/6/2022 2315 by Danae Pemberton RN  Outcome: Progressing

## 2022-07-08 NOTE — PROGRESS NOTES
Attempt made to place NGT to right nare, patient unable to withstand the discomfort of having tube placed and advanced through nare, patient requests to stop. Pt without any nausea or vomiting at this time.
Dr. Edagr Serrano made aware of fever and UC bed being available.
Placed call to Dr Axel Sánchez, messaging service, to see if ok to place NGT when patient has a gastric sleeve procedure, ok to place.
Pt resting comfortably in bed. Call light in reach. Denies needs at this time. Will continue to monitor.
Pt. Febrile at 101.2. Gave pt acetaminophen. Will continue to monitor.
Received call from transfer center, bed available at [facility] report # [phone],  time of 223
Transport here to take pt to [facility]. Report called to [address], [place]. All questions answered at this time.  Pt left with transport team. Electronically signed by Tran Castorena RN on 7/7/2022 at 9:48 PM
13.0*   HGB  --    < > 9.3*   < > 9.0*   HCT  --    < > 28.2*   < > 27.7*   PLT  --    < > 501*   < > 428   NA  --    < > 141   < > 137   K  --    < > 3.4*   < > 3.7   CL  --    < > 107   < > 105   CO2  --    < > 17*   < > 17*   BUN  --    < > 11   < > 7   CREATININE  --    < > 0.7   < > <0.5*   MG  --    < > 2.00  --   --    PHOS  --    < > 2.6  --   --    CALCIUM  --    < > 8.1*   < > 7.9*   NITRU Negative  --   --   --   --    COLORU DARK YELLOW*  --   --   --   --    BACTERIA Rare*  --   --   --   --     < > = values in this interval not displayed. EDUCATION  Patient educated about Disease Process, Medications, Smoking Cessation, Oxygenation, Incentive Spirometry and Deep Breath and Cough, Diabetes, Hyperlipidemia, Smoking Cessation, Nutrition, Exercise and Hypertension    Electronically signed by KENNETH Azul CNP on 7/7/2022 at 12:45 PM      [facility]   [phone] Office  [phone] Fax     Agree with above note. The patient was personally seen and examined. Lien Avery is doing better. Nausea resolved. Pain improved. Thirsty. NAD, alert and oriented  Abd soft, minimal epigastric tenderness, NT, no peritonitis    WBC 13  Cr <0.5    A/P: 62 yo female with gastric leak/persistent fistula, hx of sleeve gastrectomy    PPI gtt  Continue zosyn  Ok for sips  Await transfer to [facility].   Discussed care with Dr. Banda who is accepting patient    Shakila Colón MD
patient has sepsis which was present on admission. Query created by:  Elle Morillo on 7/7/2022 8:33 AM      Electronically signed by:  Nataliia Beckham CNP 7/8/2022 10:03 AM

## 2022-07-13 NOTE — DISCHARGE SUMMARY
Physician Discharge Summary     Patient ID:  Mariluz Estrada  [id number]  41 y.o.  1960    Admit date: 7/4/2022    Discharge date and time: 7/7/2022 10:14 PM     Admitting Physician: Magda Alba MD     Discharge Physician: same    Admission Diagnoses: Gastric anastomotic leak [K91.89]  Gastrointestinal anastomotic leak [K91.89]  S/P gastric sleeve procedure [Z90.3]    Discharge Diagnoses: same    Admission Condition: fair    Discharged Condition: fair    Indication for Admission: abdominal pain, gastric anastomotic leak    Hospital Course:   Mariluz Estrada is a 62 yo female with a history of sleeve gastrectomy in 2019 at [facility] complicated by leak/fistula which required stent placement. She lost approximately 90 lbs after her sleeve gastrectomy and as she dealt with her fistula. She has not seen a bariatric surgeon or weight management in a while. Over the past 3 weeks, she began to have nausea and vomiting with epigastric pain starting 2 weeks ago. She did have some fevers at home which led her to come in to be evaluated.       NAD, appears stated age  Normal respiratory effort, no accessory muscle use  Abd soft, moderate epigastric tenderness without peritonitis, ND  Ext no cyanosis or clubbing     WBC 11.7, down from 15.4 on admission  Cr 0.7  Lactic acid 0.9     CT abd/pelvis with extraluminal gas at GE junction with fluid to the left of the stomach near the adjacent splenic flexure of the colon. No active extravasation with repeat CT with oral contrast     A/P: 62 yo female with gastric perforation/leak at prior fistula site.     NPO. Patient did not tolerate NG insertion  IV hydration  Zosyn IV  Plan to transfer when bed available to [facility] who has accepted a transfer    Consults: none    Significant Diagnostic Studies:   CT ABDOMEN PELVIS W IV CONTRAST Additional Contrast? Oral   Final Result   1. Postsurgical changes of gastric sleeve.   There is increased extraluminal gas within the collection along the left aspect of the gastric cardia which   appears to be in direct continuity of the splenic flexure colon and is likely   also in continuity with the gastric remnant. Similar appearance of gas and   fluid along the medial aspect of the gastric cardia from most recent prior,   decreased since 2019 CT. No visualized extraluminal enteric contrast.      2.  There is wall thickening of the splenic flexure colon in the region of   the fistula, which may be reactive although underlying neoplasm is not   excluded. Recommend colonoscopy after the resolution of acute symptoms if   not recently performed. 3.  Mildly prominent retroperitoneal and upper abdominal lymph nodes are   likely reactive. 4.  Mild intrahepatic and extrahepatic biliary ductal dilation without   visualized distal obstructing stone. Correlate with LFTs. 5.  Small left pleural effusion. CT ABDOMEN PELVIS W IV CONTRAST Additional Contrast? Oral   Final Result   Postsurgical changes again noted of gastric bypass. Extraluminal gas locules   seen extending adjacent to the GE junction and gastrohepatic region for   example axial series 2, image 28 through 40 in similar location to previous   2019 examination and could represent components of residual or recurrent   involvement. Along the lateral surgical margin there is additional confluent   fluid signal without gas locules measuring up to 4.8 cm series 2, image 30   additionally noted. Considerations for chronic or recurrent leak associated   with postsurgical margins at the GE junction and proximal gastric region. No   focal peripherally enhancing abscess although irregular signal as detailed   may be further evaluated with enteric contrast.      RECOMMENDATIONS:   Unavailable         XR CHEST PORTABLE   Final Result   1. Small left pleural effusion, left basilar atelectasis   2.  Trace right pleural effusion                Discharge Exam:  NAD, appears stated age  Normal respiratory effort, no accessory muscle use  Abd soft, moderate epigastric tenderness without peritonitis, ND  Ext no cyanosis or clubbing    Disposition: home    In process/preliminary results:  Outstanding Order Results     No orders found from 6/5/2022 to 7/5/2022.           Patient Instructions:   Discharge Medication List as of 7/7/2022 10:15 PM      CONTINUE these medications which have NOT CHANGED    Details   estrogen, conjugated,-medroxyPROGESTERone (PREMPRO) 0.3-1.5 MG per tablet Take 1 tablet by mouth dailyHistorical Med      EPINEPHrine (EPIPEN 2-PHONG) 0.3 MG/0.3ML SOAJ injection INJECT SYRINGE AS DIRECTED FOR ALLERGIC REACTIONHistorical Med      LORazepam (ATIVAN) 0.5 MG tablet TAKE 1 TABLET BY MOUTH TWO TIMES A DAYHistorical Med      omeprazole (PRILOSEC) 20 MG delayed release capsule Take 20 mg by mouth Daily Historical Med      Fluticasone Furoate-Vilanterol (BREO ELLIPTA) 200-25 MCG/INH AEPB Inhale into the lungsHistorical Med         STOP taking these medications       oxyCODONE-acetaminophen (PERCOCET) 5-325 MG per tablet Comments:   Reason for Stopping:         aspirin 81 MG chewable tablet Comments:   Reason for Stopping:         atorvastatin (LIPITOR) 10 MG tablet Comments:   Reason for Stopping:         celecoxib (CELEBREX) 200 MG capsule Comments:   Reason for Stopping:         gabapentin (NEURONTIN) 300 MG capsule Comments:   Reason for Stopping:         hydrochlorothiazide (HYDRODIURIL) 12.5 MG tablet Comments:   Reason for Stopping:         ibuprofen (ADVIL;MOTRIN) 100 MG tablet Comments:   Reason for Stopping:         tiotropium (SPIRIVA RESPIMAT) 2.5 MCG/ACT AERS inhaler Comments:   Reason for Stopping:         traMADol (ULTRAM) 50 MG tablet Comments:   Reason for Stopping:         albuterol sulfate HFA (VENTOLIN HFA) 108 (90 Base) MCG/ACT inhaler Comments:   Reason for Stopping:         zolpidem (AMBIEN) 5 MG tablet Comments:   Reason for Stopping: varenicline (CHANTIX) 1 MG tablet Comments:   Reason for Stopping:             Activity: activity as tolerated  Diet:per accepting facility  Wound Care: none needed    Follow-up with your established care team at [facility]      Signed:  Patricia COOPER-CNP 10:55 AM 7/13/2022   [address] and Vascular Surgery  Office: [phone]   7/13/2022  10:53 AM

## 2024-03-18 ENCOUNTER — HOSPITAL ENCOUNTER (OUTPATIENT)
Dept: CT IMAGING | Age: 64
Discharge: HOME | End: 2024-03-18
Attending: FAMILY MEDICINE
Payer: MEDICARE

## 2024-03-18 ENCOUNTER — TRANSCRIBE ORDERS (OUTPATIENT)
Dept: ADMINISTRATIVE | Age: 64
End: 2024-03-18

## 2024-03-18 DIAGNOSIS — R42 DIZZINESS: Primary | ICD-10-CM

## 2024-03-18 DIAGNOSIS — R42 DIZZINESS: ICD-10-CM

## 2024-03-18 PROCEDURE — 70450 CT HEAD/BRAIN W/O DYE: CPT

## 2024-11-18 ENCOUNTER — HOSPITAL ENCOUNTER (OUTPATIENT)
Dept: GENERAL RADIOLOGY | Age: 64
Discharge: HOME | End: 2024-11-18
Payer: MEDICARE

## 2024-11-18 ENCOUNTER — HOSPITAL ENCOUNTER (OUTPATIENT)
Age: 64
Discharge: HOME | End: 2024-11-18
Payer: MEDICARE

## 2024-11-18 DIAGNOSIS — J44.1 OBSTRUCTIVE CHRONIC BRONCHITIS WITH EXACERBATION (HCC): ICD-10-CM

## 2024-11-18 PROCEDURE — 71046 X-RAY EXAM CHEST 2 VIEWS: CPT

## 2025-05-27 ENCOUNTER — TRANSCRIBE ORDERS (OUTPATIENT)
Dept: ADMINISTRATIVE | Age: 65
End: 2025-05-27

## 2025-05-27 DIAGNOSIS — I25.10 CORONARY ARTERY DISEASE DUE TO CALCIFIED CORONARY LESION: Primary | ICD-10-CM

## 2025-05-27 DIAGNOSIS — I25.84 CORONARY ARTERY DISEASE DUE TO CALCIFIED CORONARY LESION: Primary | ICD-10-CM
